# Patient Record
Sex: FEMALE | Race: WHITE | NOT HISPANIC OR LATINO | Employment: UNEMPLOYED | URBAN - METROPOLITAN AREA
[De-identification: names, ages, dates, MRNs, and addresses within clinical notes are randomized per-mention and may not be internally consistent; named-entity substitution may affect disease eponyms.]

---

## 2017-09-07 ENCOUNTER — OFFICE VISIT (OUTPATIENT)
Dept: CARDIOLOGY | Facility: MEDICAL CENTER | Age: 62
End: 2017-09-07
Payer: COMMERCIAL

## 2017-09-07 VITALS
BODY MASS INDEX: 24.11 KG/M2 | HEIGHT: 66 IN | HEART RATE: 62 BPM | OXYGEN SATURATION: 99 % | WEIGHT: 150 LBS | DIASTOLIC BLOOD PRESSURE: 70 MMHG | SYSTOLIC BLOOD PRESSURE: 130 MMHG

## 2017-09-07 DIAGNOSIS — I74.5 ILIAC ARTERY OCCLUSION, RIGHT (HCC): ICD-10-CM

## 2017-09-07 DIAGNOSIS — I15.9 SECONDARY HYPERTENSION: ICD-10-CM

## 2017-09-07 DIAGNOSIS — I70.211 ATHEROSCLEROSIS OF NATIVE ARTERY OF RIGHT LOWER EXTREMITY WITH INTERMITTENT CLAUDICATION (HCC): ICD-10-CM

## 2017-09-07 LAB — EKG IMPRESSION: NORMAL

## 2017-09-07 PROCEDURE — 93000 ELECTROCARDIOGRAM COMPLETE: CPT | Performed by: INTERNAL MEDICINE

## 2017-09-07 PROCEDURE — 99204 OFFICE O/P NEW MOD 45 MIN: CPT | Performed by: INTERNAL MEDICINE

## 2017-09-07 RX ORDER — PENTOXIFYLLINE 400 MG/1
400 TABLET, EXTENDED RELEASE ORAL
Qty: 90 TAB | Refills: 11 | Status: SHIPPED | OUTPATIENT
Start: 2017-09-07 | End: 2023-03-15

## 2017-09-07 ASSESSMENT — ENCOUNTER SYMPTOMS
COUGH: 0
SORE THROAT: 0
PND: 0
CARDIOVASCULAR NEGATIVE: 1
CHILLS: 0
LOSS OF CONSCIOUSNESS: 0
FEVER: 0
DIZZINESS: 0
STRIDOR: 0
CLAUDICATION: 0
NEUROLOGICAL NEGATIVE: 1
WEAKNESS: 0
GASTROINTESTINAL NEGATIVE: 1
RESPIRATORY NEGATIVE: 1
MUSCULOSKELETAL NEGATIVE: 1
WHEEZING: 0
SPUTUM PRODUCTION: 0
PALPITATIONS: 0
BRUISES/BLEEDS EASILY: 0
SHORTNESS OF BREATH: 0
CONSTITUTIONAL NEGATIVE: 1
ORTHOPNEA: 0
HEMOPTYSIS: 0
EYES NEGATIVE: 1

## 2017-09-07 NOTE — LETTER
[facility]   [address], [address]  [place] [address]  Phone: [phone]  Fax: [phone]              Ana Maria Sheltonstephanie Raza  1955    Encounter Date: 9/7/2017    Narciso Cespedes M.D.          PROGRESS NOTE:  No notes on file      Philip Conley M.D.  [address] [address]  VIA Facsimile: [phone]

## 2017-09-07 NOTE — PROGRESS NOTES
Subjective:   Ana Maria Raza is a 62 y.o. female who presents today as a new consultation for peripheral vascular disease. She has a long history dating history of  What sounds like iliac disease and was seen by Dr. Antwon castillo attempted a right iliac artery thrombectomy and/or  Plaque removal which apparently was unsuccessful due to the  Nature of her diffuse disease. She presents today with right lower extremity pain which has a claudication component which gets worse with exertion and is mostly relieved by rest. She does report however that she has resting leg pain. On exam she has no cyanosis or redness or decreased Refill. She does have diminished pulses in her right lower extremity probably 1+. There is mostly a absent left DP pulse. Her bilateral ABIs or abnormal greater than 1.1. She has had no further imaging of her lower extremities and has indwelling metal in her neck from effusions. She is not taking medication for cholesterol. She is on warfarin for chronic DVTs.    Past Medical History:   Diagnosis Date   • DVT (deep venous thrombosis) (CMS-HCC) 1986    right leg   • Arthritis     LEFT FOOT 2ND TOE   • Chronic neck pain    • Pain     right leg and neck, right shoulder, groin   • Unspecified hemorrhagic conditions     on coumadin   • Unspecified urinary incontinence      Past Surgical History:   Procedure Laterality Date   • HAMMERTOE CORRECTION  3/5/2014    Performed by Philip Bryant D.P.M. at SURGERY [facility] ORS   • SHOULDER ARTHROSCOPY W/ ROTATOR CUFF REPAIR  2/23/2012    Performed by DIMAS BARRETO at [facility] ORS   • SHOULDER DECOMPRESSION ARTHROSCOPIC  2/23/2012    Performed by DIMAS BARRETO at [facility] ORS   • CLAVICLE DISTAL EXCISION  2/23/2012    Performed by DIMAS BARRETO at [facility] ORS   • SHOULDER ARTHROSCOPY W/ BICIPITAL TENODESIS REPAIR  2/23/2012    Performed by DIMAS BARRETO at [facility] ORS   • TOE FUSION   8/30/2010    left foot; Performed by DINO MELO at SURGERY [facility] ORS   • LAMINOTOMY  2006    Anterior cervical fusion x 2 disc's   • GASTRIC BYPASS LAPAROSCOPIC  1995   • OTHER  1986    right leg surgery (for fistula's)   • CHOLECYSTECTOMY  1979     Family History   Problem Relation Age of Onset   • Cancer Father      brain tumor   • Heart Disease     • Hypertension     • Stroke       History   Smoking Status   • Former Smoker   • Packs/day: 1.00   • Years: 10.00   • Types: Cigarettes   • Quit date: 1/1/1990   Smokeless Tobacco   • Never Used     Allergies   Allergen Reactions   • Nkda [No Known Drug Allergy]      Outpatient Encounter Prescriptions as of 9/7/2017   Medication Sig Dispense Refill   • pentoxifylline CR (TRENTAL) 400 MG CR tablet Take 1 Tab by mouth 3 times a day, with meals. 90 Tab 11   • warfarin (COUMADIN) 1 MG TABS Take 3 Tabs by mouth every day. 100 Tab 3   • warfarin (COUMADIN) 5 MG TABS Take 1 Tab by mouth every day. 30 Tab 3   • methadone (DOLOPHINE) 10 MG TABS Take 10 mg by mouth every 3 hours.     • ciprofloxacin (CIPRO) 500 MG TABS Take 1 Tab by mouth 2 times a day. 20 Tab 0   • lidocaine (LIDODERM) 5 % PTCH Apply 1 Patch to skin as directed every 24 hours. 30 Patch 3   • ciprofloxacin (CIPRO) 500 MG TABS Take 1 Tab by mouth 2 times a day. 6 Tab 0   • Diclofenac Sodium (VOLTAREN) 1 % GEL Apply 1 g to skin as directed 2 Times a Day. 5 Tube 0   • fesoterodine fumarate (TOVIAZ) 4 MG TB24 Take 1 Tab by mouth every day. 30 Each 11   • cyclobenzaprine (FLEXERIL) 5 MG tablet Take 5-10 mg by mouth 3 times a day as needed.     • oxycodone-acetaminophen (PERCOCET) 7.5-325 MG per tablet Take 1 Tab by mouth 3 times a day.       No facility-administered encounter medications on file as of 9/7/2017.      Review of Systems   Constitutional: Negative.  Negative for chills, fever and malaise/fatigue.   HENT: Negative.  Negative for sore throat.    Eyes: Negative.    Respiratory: Negative.   "Negative for cough, hemoptysis, sputum production, shortness of breath, wheezing and stridor.    Cardiovascular: Negative.  Negative for chest pain, palpitations, orthopnea, claudication, leg swelling and PND.   Gastrointestinal: Negative.    Genitourinary: Negative.    Musculoskeletal: Negative.    Skin: Negative.    Neurological: Negative.  Negative for dizziness, loss of consciousness and weakness.   Endo/Heme/Allergies: Negative.  Does not bruise/bleed easily.   All other systems reviewed and are negative.       Objective:   /70   Pulse 62   Ht 1.676 m (5' 6\")   Wt 68 kg (150 lb)   SpO2 99%   BMI 24.21 kg/m²     Physical Exam   Constitutional: She is oriented to person, place, and time. She appears well-developed and well-nourished. No distress.   HENT:   Head: Normocephalic.   Mouth/Throat: Oropharynx is clear and moist.   Eyes: EOM are normal. Pupils are equal, round, and reactive to light. Right eye exhibits no discharge. Left eye exhibits no discharge. No scleral icterus.   Neck: Normal range of motion. Neck supple. No JVD present. No tracheal deviation present.   Cardiovascular: Normal rate, regular rhythm, S1 normal, S2 normal, normal heart sounds, intact distal pulses and normal pulses.  Exam reveals no gallop, no S3, no S4 and no friction rub.    No murmur heard.   No systolic murmur is present    No diastolic murmur is present   Pulses:       Carotid pulses are 2+ on the right side, and 2+ on the left side.       Radial pulses are 2+ on the right side, and 2+ on the left side.        Dorsalis pedis pulses are 2+ on the right side, and 2+ on the left side.        Posterior tibial pulses are 2+ on the right side, and 2+ on the left side.   Pulmonary/Chest: Effort normal and breath sounds normal. No respiratory distress. She has no wheezes. She has no rales.   Abdominal: Soft. Bowel sounds are normal. She exhibits no distension and no mass. There is no tenderness. There is no rebound and no " guarding.   Musculoskeletal: She exhibits no edema.   Neurological: She is alert and oriented to person, place, and time. No cranial nerve deficit.   Skin: Skin is warm and dry. She is not diaphoretic. No pallor.   Psychiatric: She has a normal mood and affect. Her behavior is normal. Judgment and thought content normal.   Nursing note and vitals reviewed.      Assessment:     1. Secondary hypertension  EKG    LIPID PANEL    REFERRAL TO VASCULAR SURGERY   2. Atherosclerosis of native artery of right lower extremity with intermittent claudication (CMS-HCC)  pentoxifylline CR (TRENTAL) 400 MG CR tablet    LIPID PANEL    REFERRAL TO VASCULAR SURGERY   3. Iliac artery occlusion, right (CMS-[facility])  REFERRAL TO VASCULAR SURGERY       Medical Decision Making:  Today's Assessment / Status / Plan:     62-year-old female with accommodation of chronic lower extremity DVTs and what sounds like peripheral vascular disease. Her symptoms are mostly consistent with claudication and therefore I referred her back to vascular surgery. In the meantime I would like to check her cholesterol as she would likely would have some benefit from being on a cholesterol-lowering medication. I've also started her on a low dose of Trental for her PVD. We will see her back in  3-6 months.    Thank for you allowing me to take part in your patient's care, please call should you have any questions or would like to discuss this patient.

## 2017-09-18 ENCOUNTER — TELEPHONE (OUTPATIENT)
Dept: CARDIOLOGY | Facility: MEDICAL CENTER | Age: 62
End: 2017-09-18

## 2017-09-18 NOTE — TELEPHONE ENCOUNTER
"Justin Garrison - Referral to Vascular surgery << Less Detail',event)\" href=\"javascript:;\">More Detail >>      Referral to Vascular surgery   Justin Garrison   Sent: Tue September 12, 2017 10:01 AM   To: Daly Diaz R.N.                  Message     The referral to vascular surgery will be sent to the office of [facility].   The contact number is [phone]     =================================================    Called patient, she is scheduled to see vascular surgery in November, and she requested to be put on their wait list for an earlier appointment.    BENNIE FERGUSON  "

## 2018-01-03 ENCOUNTER — HOSPITAL ENCOUNTER (OUTPATIENT)
Dept: RADIOLOGY | Facility: MEDICAL CENTER | Age: 63
End: 2018-01-03
Attending: SURGERY
Payer: COMMERCIAL

## 2018-01-03 DIAGNOSIS — Z86.718 HISTORY OF DVT (DEEP VEIN THROMBOSIS): ICD-10-CM

## 2018-01-03 DIAGNOSIS — I82.502 CHRONIC DEEP VEIN THROMBOSIS (DVT) OF LEFT LOWER EXTREMITY, UNSPECIFIED VEIN (HCC): ICD-10-CM

## 2018-01-03 PROCEDURE — 74177 CT ABD & PELVIS W/CONTRAST: CPT

## 2018-01-03 PROCEDURE — 700117 HCHG RX CONTRAST REV CODE 255: Performed by: SURGERY

## 2018-01-03 RX ADMIN — IOHEXOL 50 ML: 240 INJECTION, SOLUTION INTRATHECAL; INTRAVASCULAR; INTRAVENOUS; ORAL at 13:58

## 2018-01-03 RX ADMIN — IOHEXOL 100 ML: 350 INJECTION, SOLUTION INTRAVENOUS at 13:57

## 2018-02-21 ENCOUNTER — OFFICE VISIT (OUTPATIENT)
Dept: URGENT CARE | Facility: PHYSICIAN GROUP | Age: 63
End: 2018-02-21
Payer: COMMERCIAL

## 2018-02-21 ENCOUNTER — HOSPITAL ENCOUNTER (EMERGENCY)
Facility: MEDICAL CENTER | Age: 63
End: 2018-02-21
Attending: EMERGENCY MEDICINE
Payer: COMMERCIAL

## 2018-02-21 ENCOUNTER — APPOINTMENT (OUTPATIENT)
Dept: RADIOLOGY | Facility: MEDICAL CENTER | Age: 63
End: 2018-02-21
Attending: EMERGENCY MEDICINE
Payer: COMMERCIAL

## 2018-02-21 VITALS
HEIGHT: 66 IN | BODY MASS INDEX: 25.71 KG/M2 | HEART RATE: 63 BPM | OXYGEN SATURATION: 99 % | DIASTOLIC BLOOD PRESSURE: 72 MMHG | TEMPERATURE: 98.9 F | SYSTOLIC BLOOD PRESSURE: 158 MMHG | WEIGHT: 160 LBS

## 2018-02-21 VITALS
OXYGEN SATURATION: 96 % | HEART RATE: 78 BPM | RESPIRATION RATE: 16 BRPM | DIASTOLIC BLOOD PRESSURE: 73 MMHG | TEMPERATURE: 99 F | SYSTOLIC BLOOD PRESSURE: 175 MMHG | HEIGHT: 66 IN | WEIGHT: 158.95 LBS | BODY MASS INDEX: 25.55 KG/M2

## 2018-02-21 DIAGNOSIS — R20.0 LEFT SIDED NUMBNESS: ICD-10-CM

## 2018-02-21 DIAGNOSIS — M54.12 RADICULOPATHY OF CERVICAL SPINE: ICD-10-CM

## 2018-02-21 LAB
ALBUMIN SERPL BCP-MCNC: 4.1 G/DL (ref 3.2–4.9)
ALBUMIN/GLOB SERPL: 1.4 G/DL
ALP SERPL-CCNC: 97 U/L (ref 30–99)
ALT SERPL-CCNC: 13 U/L (ref 2–50)
ANION GAP SERPL CALC-SCNC: 11 MMOL/L (ref 0–11.9)
APTT PPP: 30.1 SEC (ref 24.7–36)
AST SERPL-CCNC: 25 U/L (ref 12–45)
BASOPHILS # BLD AUTO: 0.4 % (ref 0–1.8)
BASOPHILS # BLD: 0.02 K/UL (ref 0–0.12)
BILIRUB SERPL-MCNC: 0.8 MG/DL (ref 0.1–1.5)
BUN SERPL-MCNC: 8 MG/DL (ref 8–22)
CALCIUM SERPL-MCNC: 9.4 MG/DL (ref 8.5–10.5)
CHLORIDE SERPL-SCNC: 108 MMOL/L (ref 96–112)
CO2 SERPL-SCNC: 20 MMOL/L (ref 20–33)
CREAT SERPL-MCNC: 0.61 MG/DL (ref 0.5–1.4)
EKG IMPRESSION: NORMAL
EOSINOPHIL # BLD AUTO: 0.01 K/UL (ref 0–0.51)
EOSINOPHIL NFR BLD: 0.2 % (ref 0–6.9)
ERYTHROCYTE [DISTWIDTH] IN BLOOD BY AUTOMATED COUNT: 55.1 FL (ref 35.9–50)
GLOBULIN SER CALC-MCNC: 3 G/DL (ref 1.9–3.5)
GLUCOSE SERPL-MCNC: 88 MG/DL (ref 65–99)
HCT VFR BLD AUTO: 40.5 % (ref 37–47)
HGB BLD-MCNC: 14 G/DL (ref 12–16)
INR PPP: 2.5 (ref 0.87–1.13)
LYMPHOCYTES # BLD AUTO: 0.68 K/UL (ref 1–4.8)
LYMPHOCYTES NFR BLD: 13.4 % (ref 22–41)
MCH RBC QN AUTO: 40.5 PG (ref 27–33)
MCHC RBC AUTO-ENTMCNC: 34.6 G/DL (ref 33.6–35)
MCV RBC AUTO: 117.1 FL (ref 81.4–97.8)
MONOCYTES # BLD AUTO: 0.32 K/UL (ref 0–0.85)
MONOCYTES NFR BLD AUTO: 6.3 % (ref 0–13.4)
NEUTROPHILS # BLD AUTO: 4.06 K/UL (ref 2–7.15)
NEUTROPHILS NFR BLD: 79.7 % (ref 44–72)
PLATELET # BLD AUTO: 238 K/UL (ref 164–446)
PMV BLD AUTO: 9.6 FL (ref 9–12.9)
POTASSIUM SERPL-SCNC: 3.9 MMOL/L (ref 3.6–5.5)
PROT SERPL-MCNC: 7.1 G/DL (ref 6–8.2)
PROTHROMBIN TIME: 26.7 SEC (ref 12–14.6)
RBC # BLD AUTO: 3.46 M/UL (ref 4.2–5.4)
SODIUM SERPL-SCNC: 139 MMOL/L (ref 135–145)
TROPONIN I SERPL-MCNC: <0.01 NG/ML (ref 0–0.04)
WBC # BLD AUTO: 5.1 K/UL (ref 4.8–10.8)

## 2018-02-21 PROCEDURE — 80053 COMPREHEN METABOLIC PANEL: CPT

## 2018-02-21 PROCEDURE — 85025 COMPLETE CBC W/AUTO DIFF WBC: CPT

## 2018-02-21 PROCEDURE — 99284 EMERGENCY DEPT VISIT MOD MDM: CPT

## 2018-02-21 PROCEDURE — 99205 OFFICE O/P NEW HI 60 MIN: CPT | Performed by: PHYSICIAN ASSISTANT

## 2018-02-21 PROCEDURE — 85610 PROTHROMBIN TIME: CPT

## 2018-02-21 PROCEDURE — 85730 THROMBOPLASTIN TIME PARTIAL: CPT

## 2018-02-21 PROCEDURE — 84484 ASSAY OF TROPONIN QUANT: CPT

## 2018-02-21 PROCEDURE — 70450 CT HEAD/BRAIN W/O DYE: CPT

## 2018-02-21 PROCEDURE — 93005 ELECTROCARDIOGRAM TRACING: CPT | Performed by: EMERGENCY MEDICINE

## 2018-02-21 PROCEDURE — 71045 X-RAY EXAM CHEST 1 VIEW: CPT

## 2018-02-21 ASSESSMENT — ENCOUNTER SYMPTOMS
EXTREMITY NUMBNESS: 1
TINGLING: 1
SPUTUM PRODUCTION: 0
NAUSEA: 0
HEADACHES: 1
COUGH: 1
FATIGUE: 0
PALPITATIONS: 0
SORE THROAT: 0
NUMBNESS: 1
FOCAL WEAKNESS: 0
FEVER: 0
LOSS OF CONSCIOUSNESS: 0
WEAKNESS: 0
SENSORY CHANGE: 1
MYALGIAS: 0
VOMITING: 0
SPEECH CHANGE: 0
BLURRED VISION: 1
VISUAL CHANGE: 1
VERTIGO: 0
DIZZINESS: 0
ABDOMINAL PAIN: 0
WHEEZING: 0
SHORTNESS OF BREATH: 0
CHILLS: 0

## 2018-02-21 ASSESSMENT — PAIN SCALES - GENERAL: PAINLEVEL_OUTOF10: 4

## 2018-02-21 NOTE — ED TRIAGE NOTES
"Chief Complaint   Patient presents with   • Numbness     left hand leg     Pt report onset of left leg and arm numbness and tingling started last night. Pt reports blurred vision with HA.  Pt AAO x4. No facial droop. Pt denies any falls or trauma. Blood pressure (!) 175/73, pulse 91, temperature 37.2 °C (99 °F), temperature source Temporal, resp. rate 16, height 1.676 m (5' 6\"), weight 72.1 kg (158 lb 15.2 oz), SpO2 99 %.      "
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
## 2018-02-21 NOTE — PROGRESS NOTES
Subjective:      Ana Maria Raza is a 62 y.o. female who presents with Tingling (tingling sensation in left arm and leg onset last night)            Numbness   This is a new problem. The current episode started yesterday (yesterday at 11 pm- left arm and left leg tingling and numbness). The problem occurs constantly. The problem has been unchanged. Associated symptoms include coughing, headaches, numbness and a visual change (blurred vision- bilateral last night- resolved). Pertinent negatives include no abdominal pain, chest pain, chills, congestion, fatigue, fever, myalgias, nausea, sore throat, vertigo, vomiting or weakness. Nothing aggravates the symptoms. Treatments tried: patient has cold symptoms. Patient took OTC cold medication.       Past Medical History:   Diagnosis Date   • Arthritis     LEFT FOOT 2ND TOE   • Chronic neck pain    • DVT (deep venous thrombosis) (CMS-[facility]) 1986    right leg   • Pain     right leg and neck, right shoulder, groin   • Unspecified hemorrhagic conditions     on coumadin   • Unspecified urinary incontinence        Past Surgical History:   Procedure Laterality Date   • HAMMERTOE CORRECTION  3/5/2014    Performed by Philip Bryant D.P.M. at [facility] ORS   • SHOULDER ARTHROSCOPY W/ ROTATOR CUFF REPAIR  2/23/2012    Performed by DIMAS BARRETO at [facility]   • SHOULDER DECOMPRESSION ARTHROSCOPIC  2/23/2012    Performed by DIMAS BARRETO at [facility]   • CLAVICLE DISTAL EXCISION  2/23/2012    Performed by DIMAS BARRETO at [facility] ORS   • SHOULDER ARTHROSCOPY W/ BICIPITAL TENODESIS REPAIR  2/23/2012    Performed by DIMAS BARRETO at [facility]   • TOE FUSION  8/30/2010    left foot; Performed by DINO MELO at [facility]   • LAMINOTOMY  2006    Anterior cervical fusion x 2 disc's   • GASTRIC BYPASS LAPAROSCOPIC  1995   • OTHER  1986    right leg surgery (for fistula's)   •  "CHOLECYSTECTOMY  1979       Family History   Problem Relation Age of Onset   • Cancer Father      brain tumor   • Heart Disease     • Hypertension     • Stroke         Allergies   Allergen Reactions   • Nkda [No Known Drug Allergy]        Medications, Allergies, and current problem list reviewed today in Epic    Review of Systems   Constitutional: Negative for chills, fatigue, fever and malaise/fatigue.   HENT: Negative for congestion, ear discharge, ear pain and sore throat.    Eyes: Positive for blurred vision.   Respiratory: Positive for cough. Negative for sputum production, shortness of breath and wheezing.    Cardiovascular: Negative for chest pain, palpitations and leg swelling.   Gastrointestinal: Negative for abdominal pain, nausea and vomiting.   Musculoskeletal: Negative for myalgias.   Neurological: Positive for tingling, sensory change, numbness and headaches. Negative for dizziness, vertigo, speech change, focal weakness, loss of consciousness and weakness.     All other systems reviewed and are negative.        Objective:     /72   Pulse 63   Temp 37.2 °C (98.9 °F)   Ht 1.676 m (5' 6\")   Wt 72.6 kg (160 lb)   SpO2 99%   BMI 25.82 kg/m²      Physical Exam   Constitutional: She is oriented to person, place, and time. She appears well-developed and well-nourished. No distress.   HENT:   Head: Normocephalic and atraumatic.   Mouth/Throat: Uvula is midline, oropharynx is clear and moist and mucous membranes are normal.   Eyes: Conjunctivae and EOM are normal. Pupils are equal, round, and reactive to light.   Cardiovascular: Normal rate, regular rhythm and normal heart sounds.  Exam reveals no gallop and no friction rub.    No murmur heard.  Pulmonary/Chest: Effort normal. No respiratory distress. She has no wheezes. She has no rales.   Neurological: She is alert and oriented to person, place, and time. She has normal strength. A sensory deficit (decreased sensation to touch on left hand- more " so on ulnar distribution and left lateral leg and foot) is present. No cranial nerve deficit.    strength normal bilaterally. No pronator drift.   Skin: Skin is warm and dry. No rash noted.   Psychiatric: She has a normal mood and affect. Her behavior is normal. Judgment and thought content normal.               Assessment/Plan:     1. Left sided numbness    At this time, I feel the patient requires a higher level of care including closer monitoring, stat lab work and/or imaging for further evaluation for complaints of left sided numbness.This has been discussed with the patient and they state agreement and understanding.  I offered the patient an ambulance ride and  the patient is declining at this time. Patient signed AMA- refusing [facility] transport. The patient is in no acute distress upon clinic departure and will go directly to ED without delay. Her  will drive her via POV.     - Tried calling [facility]. Was placed on hold multiple times- On hold with transfer center > 5 minutes.    -  AMA/Refusal of Treatment      Nichole Sky P.A.-C.

## 2018-02-21 NOTE — DISCHARGE INSTRUCTIONS
Cervical Radiculopathy  Cervical radiculopathy happens when a nerve in the neck is pinched or bruised by a slipped (herniated) disk or by arthritic changes in the bones of the cervical spine. This can occur due to an injury or as part of the normal aging process. Pressure on the cervical nerves can cause pain or numbness that runs from your neck all the way down into your arm and fingers.  CAUSES   There are many possible causes, including:  · Injury.  · Muscle tightness in the neck from overuse.  · Swollen, painful joints (arthritis).  · Breakdown or degeneration in the bones and joints of the spine (spondylosis) due to aging.  · Bone spurs that may develop near the cervical nerves.  SYMPTOMS   Symptoms include pain, weakness, or numbness in the affected arm and hand. Pain can be severe or irritating. Symptoms may be worse when extending or turning the neck.  DIAGNOSIS   Your caregiver will ask about your symptoms and do a physical exam. He or she may test your strength and reflexes. X-rays, CT scans, and MRI scans may be needed in cases of injury or if the symptoms do not go away after a period of time. Electromyography (EMG) or nerve conduction testing may be done to study how your nerves and muscles are working.  TREATMENT   Your caregiver may recommend certain exercises to help relieve your symptoms. Cervical radiculopathy can, and often does, get better with time and treatment. If your problems continue, treatment options may include:  · Wearing a soft collar for short periods of time.  · Physical therapy to strengthen the neck muscles.  · Medicines, such as nonsteroidal anti-inflammatory drugs (NSAIDs), oral corticosteroids, or spinal injections.  · Surgery. Different types of surgery may be done depending on the cause of your problems.  HOME CARE INSTRUCTIONS   · Put ice on the affected area.  ¨ Put ice in a plastic bag.  ¨ Place a towel between your skin and the bag.  ¨ Leave the ice on for 15-20 minutes,  03-04 times a day or as directed by your caregiver.  · If ice does not help, you can try using heat. Take a warm shower or bath, or use a hot water bottle as directed by your caregiver.  · You may try a gentle neck and shoulder massage.  · Use a flat pillow when you sleep.  · Only take over-the-counter or prescription medicines for pain, discomfort, or fever as directed by your caregiver.  · If physical therapy was prescribed, follow your caregiver's directions.  · If a soft collar was prescribed, use it as directed.  SEEK IMMEDIATE MEDICAL CARE IF:   · Your pain gets much worse and cannot be controlled with medicines.  · You have weakness or numbness in your hand, arm, face, or leg.  · You have a high fever or a stiff, rigid neck.  · You lose bowel or bladder control (incontinence).  · You have trouble with walking, balance, or speaking.  MAKE SURE YOU:   · Understand these instructions.  · Will watch your condition.  · Will get help right away if you are not doing well or get worse.     This information is not intended to replace advice given to you by your health care provider. Make sure you discuss any questions you have with your health care provider.     Document Released: 09/12/2002 Document Revised: 03/11/2013 Document Reviewed: 02/11/2016  [email or web address] Interactive Patient Education ©2016 [email or web address]

## 2018-08-31 ENCOUNTER — OFFICE VISIT (OUTPATIENT)
Dept: PHYSICAL MEDICINE AND REHAB | Facility: MEDICAL CENTER | Age: 63
End: 2018-08-31
Payer: COMMERCIAL

## 2018-08-31 VITALS
SYSTOLIC BLOOD PRESSURE: 126 MMHG | DIASTOLIC BLOOD PRESSURE: 62 MMHG | OXYGEN SATURATION: 95 % | WEIGHT: 164.9 LBS | TEMPERATURE: 97.7 F | BODY MASS INDEX: 26.5 KG/M2 | HEIGHT: 66 IN | HEART RATE: 79 BPM

## 2018-08-31 DIAGNOSIS — G89.29 OTHER CHRONIC PAIN: ICD-10-CM

## 2018-08-31 DIAGNOSIS — M79.604 RIGHT LEG PAIN: ICD-10-CM

## 2018-08-31 DIAGNOSIS — E55.9 VITAMIN D DEFICIENCY: ICD-10-CM

## 2018-08-31 PROCEDURE — 99205 OFFICE O/P NEW HI 60 MIN: CPT | Performed by: PHYSICAL MEDICINE & REHABILITATION

## 2018-08-31 RX ORDER — DULOXETIN HYDROCHLORIDE 30 MG/1
30 CAPSULE, DELAYED RELEASE ORAL DAILY
Qty: 7 CAP | Refills: 0 | Status: SHIPPED | OUTPATIENT
Start: 2018-08-31 | End: 2018-09-07

## 2018-08-31 RX ORDER — WARFARIN SODIUM 3 MG/1
3-6 TABLET ORAL EVERY MORNING
COMMUNITY

## 2018-08-31 RX ORDER — DULOXETIN HYDROCHLORIDE 60 MG/1
60 CAPSULE, DELAYED RELEASE ORAL DAILY
Qty: 30 CAP | Refills: 1 | Status: SHIPPED | OUTPATIENT
Start: 2018-08-31 | End: 2018-09-30

## 2018-08-31 ASSESSMENT — PAIN SCALES - GENERAL: PAINLEVEL: 8=MODERATE-SEVERE PAIN

## 2018-08-31 NOTE — PROGRESS NOTES
New patient note    Physiatry (physical medicine and  Rehabilitation), interventional spine and sports medicine    Date of Service: 8/31/2018    Chief complaint: Pain in the right leg    HISTORY    HPI: Ana Maria Raza 63 y.o. female who presents today for evaluation of right groin pain and right leg pain.  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   Lately, she reports that she has had more pain and swelling in the right leg.   This has been gradually worsening and she does not feel like the stockings are as helpful.    She does not particularly note any back pain.  There is sharp pain in the groin. She does not report significant knee pain.  The aching in her calf seems to be more anterior than posterior and she reports that the aching pain is constant.  It is made worse with walking or standing and going up and down stairs.         Medical records review:  Previous treatments:    Physical Therapy: No     Medications the patient is tried: NSAIDs, Narcotics and tylenol, she reports that she has also tried CBD oil without help.    ORT:0   PHQ-9:2    ROS:   ENT: uses hearing aids  Eye: wears glasses    Red Flags ROS:   Fever, Chills, Sweats: Denies  Involuntary Weight Loss: Denies  Bladder Incontinence: Denies  Bowel Incontinence: denies  Saddle Anesthesia: Denies    All other systems reviewed and negative.       PMHx:   Past Medical History:   Diagnosis Date   • Arthritis     LEFT FOOT 2ND TOE   • Chronic neck pain    • DVT (deep venous thrombosis) ([facility]) 1986    right leg   • Pain     right leg and neck, right shoulder, groin   • Unspecified hemorrhagic conditions     on coumadin   • Unspecified urinary incontinence        PSHx:   Past Surgical History:   Procedure Laterality Date   • HAMMERTOE CORRECTION   3/5/2014    Performed by Philip Bryant D.P.M. at [facility]S   • SHOULDER ARTHROSCOPY W/ ROTATOR CUFF REPAIR  2/23/2012    Performed by DIMAS BARRETO at [facility] ORS   • SHOULDER DECOMPRESSION ARTHROSCOPIC  2/23/2012    Performed by DIMAS BARRETO at [facility] ORS   • CLAVICLE DISTAL EXCISION  2/23/2012    Performed by DIMAS BARRETO at [facility] ORS   • SHOULDER ARTHROSCOPY W/ BICIPITAL TENODESIS REPAIR  2/23/2012    Performed by DIMAS BARRETO at [facility] ORS   • TOE FUSION  8/30/2010    left foot; Performed by DINO MELO at [facility] ORS   • LAMINOTOMY  2006    Anterior cervical fusion x 2 disc's   • GASTRIC BYPASS LAPAROSCOPIC  1995   • OTHER  1986    right leg surgery (for fistula's)   • CHOLECYSTECTOMY  1979       Family history   Family History   Problem Relation Age of Onset   • Cancer Father         brain tumor   • Heart Disease Unknown    • Hypertension Unknown    • Stroke Unknown          Medications:   Current Outpatient Prescriptions   Medication   • warfarin (COUMADIN) 3 MG Tab   • DULoxetine (CYMBALTA) 30 MG Cap DR Particles   • DULoxetine (CYMBALTA) 60 MG Cap DR Particles delayed-release capsule   • warfarin (COUMADIN) 5 MG TABS   • pentoxifylline CR (TRENTAL) 400 MG CR tablet   • ciprofloxacin (CIPRO) 500 MG TABS   • warfarin (COUMADIN) 1 MG TABS   • lidocaine (LIDODERM) 5 % PTCH   • ciprofloxacin (CIPRO) 500 MG TABS   • Diclofenac Sodium (VOLTAREN) 1 % GEL   • fesoterodine fumarate (TOVIAZ) 4 MG TB24   • methadone (DOLOPHINE) 10 MG TABS   • cyclobenzaprine (FLEXERIL) 5 MG tablet   • oxycodone-acetaminophen (PERCOCET) 7.5-325 MG per tablet     No current facility-administered medications for this visit.        Allergies:   Allergies   Allergen Reactions   • Nkda [No Known Drug Allergy]        Social Hx:   Social History     Social History   • Marital status:      Spouse name: N/A   • Number of children:  "N/A   • Years of education: N/A     Occupational History   • Not on file.     Social History Main Topics   • Smoking status: Former Smoker     Packs/day: 1.00     Years: 10.00     Types: Cigarettes     Quit date: 1/1/1990   • Smokeless tobacco: Never Used   • Alcohol use 10.5 oz/week     21 Standard drinks or equivalent per week      Comment: 3 per day   • Drug use: No   • Sexual activity: Not on file     Other Topics Concern   •  Service No   • Blood Transfusions Yes   • Caffeine Concern No   • Occupational Exposure No   • Hobby Hazards No   • Sleep Concern No   • Stress Concern No   • Weight Concern No   • Special Diet No   • Back Care No   • Exercise No   • Bike Helmet No   • Seat Belt Yes   • Self-Exams Yes     Social History Narrative   • No narrative on file         EXAMINATION     Physical Exam:   Vitals: Blood pressure 126/62, pulse 79, temperature 36.5 °C (97.7 °F), height 1.676 m (5' 6\"), weight 74.8 kg (164 lb 14.5 oz), SpO2 95 %.    Constitutional:   Body Habitus: Body mass index is 26.62 kg/m².  Cooperation: Fully cooperates with exam  Appearance: Well-groomed, well-nourished, not disheveled, in no acute distress    Eyes: No scleral icterus, no proptosis     ENT -no obvious auditory deficits, no facial droop     Skin -no rashes or lesions noted     Respiratory-  breathing comfortable on room air, no audible wheezing    Cardiovascular- No lower extremity edema is noted.  Right lower extremity is minimally larger than the left    Psychiatric- alert and oriented ×3. Normal affect.     Gait - normal gait, no use of ambulatory device, nonantalgic. The patient can toe walk with ease. The patient can heel walk with ease.    Musculoskeletal -   Cervical spine   Inspection: No deformities of the skin over the cervical spine. No rashes or lesions.    full  A/P ROM in all directions, without  pain      Spurling’s sign: negative bilaterally    No signs of muscular atrophy in bilateral upper extremities "     Thoracic/Lumbar Spine/Sacral Spine/Hips   Inspection: No evidence of atrophy in bilateral lower extremities throughout     ROM: full  AROM with flexion, extension, lateral flexion, and rotation bilaterally, without pain     Palpation:   No tenderness to palpation in midline at T1-T12 levels. No tenderness to palpation in the left and right of the midline T1-L5  palpation over SI joint: negative bilaterally    palpation over buttock: negative bilaterally    palpation in hip or over the greater trochanters: negative bilaterally      Lumbar spine Special tests  Neuro tension  Straight leg test negative bilaterally       HIP  FAIR test negative bilaterally    Range of motion in the hips is within normal limits in flexion, extension, abduction, internal rotation, external rotation with minimally decreased IR and ER on the right compared with the left    SI joint tests  Observation patient sits on one buttocks: Negative  ANICETO test negative bilaterally       Neuro       Key points for the international standards for neurological classification of spinal cord injury (ISNCSCI) to light touch.     Dermatome R L   C4 2  2   C5 2 2   C6 2 2   C7 2 2   C8 2 2   T1 2 2   T2 2 2   L2 2 2   L3 2 2   L4 2 2   L5 2 2   S1 2 2   S2 2 2           Motor Exam Upper Extremities   ? Myotome R L   Shoulder flexion C5 5 5   Elbow flexion C5 5 5   Wrist extension C6 5 5   Elbow extension C7 5 5   Finger flexion C8 5 5   Finger abduction T1 5 5         Motor Exam Lower Extremities    ? Myotome R L   Hip flexion L2 5 5   Knee extension L3 5 5   Ankle dorsiflexion L4 5 5   Toe extension L5 5 5   Ankle plantarflexion S1 5 5         Guerra’s sign negative bilaterally   Babinski sign negative bilaterally   Clonus of the ankle negative bilaterally     Reflexes  ?  R L   Biceps  2+  2+   Brachioradialis  2+ 2+   Patella  2+ 2+   Achilles   2+ 2+       MEDICAL DECISION MAKING    Medical records review: see under HPI section.     DATA    Labs:    Lab Results   Component Value Date/Time    SODIUM 139 02/21/2018 10:52 AM    POTASSIUM 3.9 02/21/2018 10:52 AM    CHLORIDE 108 02/21/2018 10:52 AM    CO2 20 02/21/2018 10:52 AM    ANION 11.0 02/21/2018 10:52 AM    GLUCOSE 88 02/21/2018 10:52 AM    BUN 8 02/21/2018 10:52 AM    CREATININE 0.61 02/21/2018 10:52 AM    CALCIUM 9.4 02/21/2018 10:52 AM    ASTSGOT 25 02/21/2018 10:52 AM    ALTSGPT 13 02/21/2018 10:52 AM    TBILIRUBIN 0.8 02/21/2018 10:52 AM    ALBUMIN 4.1 02/21/2018 10:52 AM    TOTPROTEIN 7.1 02/21/2018 10:52 AM    GLOBULIN 3.0 02/21/2018 10:52 AM    AGRATIO 1.4 02/21/2018 10:52 AM       Lab Results   Component Value Date/Time    PROTHROMBTM 26.7 (H) 02/21/2018 10:52 AM    INR 2.50 (H) 02/21/2018 10:52 AM        Lab Results   Component Value Date/Time    WBC 5.1 02/21/2018 10:52 AM    RBC 3.46 (L) 02/21/2018 10:52 AM    HEMOGLOBIN 14.0 02/21/2018 10:52 AM    HEMATOCRIT 40.5 02/21/2018 10:52 AM    .1 (H) 02/21/2018 10:52 AM    MCH 40.5 (H) 02/21/2018 10:52 AM    MCHC 34.6 02/21/2018 10:52 AM    MPV 9.6 02/21/2018 10:52 AM    NEUTSPOLYS 79.70 (H) 02/21/2018 10:52 AM    LYMPHOCYTES 13.40 (L) 02/21/2018 10:52 AM    MONOCYTES 6.30 02/21/2018 10:52 AM    EOSINOPHILS 0.20 02/21/2018 10:52 AM    BASOPHILS 0.40 02/21/2018 10:52 AM        No results found for: HBA1C     Imaging:   Imaging reviewed, these are my reads:  MRI cervical spine  05/10/2011  Anterior plate and screw fixation C5-T1. There is note of metallic artifact, but there does appear to be signal change in the cervical spine.  There is no significant cervical spinal stenosis or disc herniations     IMAGING radiology reads. I reviewed the following radiology reads   CT abdomen and pelvis 01/03/2018  1.  Negative for pelvic deep venous thrombus    2.  Dilation the right atrium and inferior vena cava suggesting elevated right heart pressures    3.  Prior cholecystectomy. Mild basilar dilation with common bile duct measuring 11 mm. Significance of  this finding depends primarily on whether there is abnormality of liver function test    4.  Bilateral renal cyst    5.  Gastric and right inguinal postoperative changes    6.  Aortic atherosclerotic plaque                                    Results for orders placed during the hospital encounter of 05/10/11   MR-CERVICAL SPINE-W/O    Impression Extensive prior surgery and multilevel moderate spondylosis without cord compression or root compression.  No change.                                                               Results for orders placed during the hospital encounter of 02/09/07   DX-CERVICAL SPINE-4+ VIEWS    Impression IMPRESSION:    1. C5-T1 INTERBODY FUSION WITH BONY INGROWTH THROUGH A METALLIC CAGE, AND   WITH NO RADIOGRAPHIC EVIDENCE OF COMPLICATION.     2. NO SEGMENTAL INSTABILITY BETWEEN THE FLEXION AND EXTENSION IMAGES.     GEK/bela            Read By HUGH JOSEPH MD on Feb 9 2007 10:38AM  : ZANNE Transcription Date: Feb 9 2007  2:27PM  THIS DOCUMENT HAS BEEN ELECTRONICALLY SIGNED BY: HUGH JOSEPH MD on   Feb 9 2007  4:15PM                                                                                Diagnosis   Visit Diagnoses     ICD-10-CM   1. Right leg pain M79.604   2. Vitamin D deficiency E55.9   3. Other chronic pain G89.29           ASSESSMENT:  Anson Natasha Raza 63 y.o. female with report of right groin and right leg pain     An aMaria was seen today for new patient.    Diagnoses and all orders for this visit:    Right leg pain  -     DX-LUMBAR SPINE-2 OR 3 VIEWS  -     DX-HIP-COMPLETE - UNILATERAL 2+ RIGHT  -     DX-KNEE 3 VIEWS RIGHT  -     DULoxetine (CYMBALTA) 30 MG Cap DR Particles; Take 1 Cap by mouth every day for 7 days.  -     DULoxetine (CYMBALTA) 60 MG Cap DR Particles delayed-release capsule; Take 1 Cap by mouth every day for 30 days.    Vitamin D deficiency  -     VITAMIN D,25 HYDROXY; Future    Other chronic pain  -     DULoxetine (CYMBALTA)  30 MG Cap DR Particles; Take 1 Cap by mouth every day for 7 days.  -     DULoxetine (CYMBALTA) 60 MG Cap DR Particles delayed-release capsule; Take 1 Cap by mouth every day for 30 days.    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 Plan to evaluate right hip and knee as well as the lumbar spine, as it is possible that symptoms have been attributed to the AV fistula and recurrent DVTs.      Discussed rechecking her vitamin D status as this has been low in the past.    Discussed trial of cymbalta for chronic leg pain.  She is agreeable to a trial.     Follow-up:4 weeks or after imaging    Please note that this dictation was created using voice recognition software. I have made every reasonable attempt to correct obvious errors but there may be errors of grammar and content that I may have overlooked prior to finalization of this note.      Joe Vigil MD  Physical Medicine and Rehabilitation  Interventional Spine and Sports Physiatry  [facility]

## 2018-09-01 ASSESSMENT — PATIENT HEALTH QUESTIONNAIRE - PHQ9
5. POOR APPETITE OR OVEREATING: 0 - NOT AT ALL
CLINICAL INTERPRETATION OF PHQ2 SCORE: 1
SUM OF ALL RESPONSES TO PHQ QUESTIONS 1-9: 2

## 2018-10-04 ENCOUNTER — HOSPITAL ENCOUNTER (OUTPATIENT)
Dept: RADIOLOGY | Facility: MEDICAL CENTER | Age: 63
End: 2018-10-04
Attending: PHYSICAL MEDICINE & REHABILITATION
Payer: COMMERCIAL

## 2018-10-04 PROCEDURE — 72100 X-RAY EXAM L-S SPINE 2/3 VWS: CPT

## 2018-10-04 PROCEDURE — 73562 X-RAY EXAM OF KNEE 3: CPT | Mod: RT

## 2018-10-04 PROCEDURE — 73502 X-RAY EXAM HIP UNI 2-3 VIEWS: CPT | Mod: RT

## 2018-10-05 ENCOUNTER — OFFICE VISIT (OUTPATIENT)
Dept: PHYSICAL MEDICINE AND REHAB | Facility: MEDICAL CENTER | Age: 63
End: 2018-10-05
Payer: COMMERCIAL

## 2018-10-05 VITALS
DIASTOLIC BLOOD PRESSURE: 82 MMHG | BODY MASS INDEX: 26.82 KG/M2 | HEIGHT: 66 IN | HEART RATE: 90 BPM | TEMPERATURE: 98.4 F | WEIGHT: 166.89 LBS | SYSTOLIC BLOOD PRESSURE: 118 MMHG | OXYGEN SATURATION: 99 %

## 2018-10-05 DIAGNOSIS — G89.29 OTHER CHRONIC PAIN: ICD-10-CM

## 2018-10-05 DIAGNOSIS — M79.604 RIGHT LEG PAIN: ICD-10-CM

## 2018-10-05 PROCEDURE — 99213 OFFICE O/P EST LOW 20 MIN: CPT | Performed by: PHYSICAL MEDICINE & REHABILITATION

## 2018-10-05 RX ORDER — DULOXETIN HYDROCHLORIDE 60 MG/1
60 CAPSULE, DELAYED RELEASE ORAL DAILY
Qty: 30 CAP | Refills: 2 | Status: SHIPPED | OUTPATIENT
Start: 2018-10-05 | End: 2018-11-04

## 2018-10-05 RX ORDER — GABAPENTIN 100 MG/1
100 CAPSULE ORAL 3 TIMES DAILY
Qty: 90 CAP | Refills: 1 | Status: SHIPPED | OUTPATIENT
Start: 2018-10-05 | End: 2018-11-04

## 2018-10-05 ASSESSMENT — PAIN SCALES - GENERAL: PAINLEVEL: 6=MODERATE PAIN

## 2018-10-05 NOTE — PATIENT INSTRUCTIONS
Start 100mg at bedtime for 3-5 days, then 200mg (2 pills) at bedtime for 3-5 days, then take 300mg (3pills) at bedtime.

## 2018-10-05 NOTE — PROGRESS NOTES
Follow-up patient note    Physiatry (physical medicine and  Rehabilitation), interventional spine and sports medicine    Date of Service: 10/05/2018    Chief complaint: Pain in the right leg    HISTORY  Ana Maria Raza 63 y.o. female who presents today for evaluation of right groin pain and right leg pain.  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   Lately, she reports that she has had more pain and swelling in the right leg.   This has been gradually worsening and she does not feel like the stockings are as helpful.    Interval history:    Since the last visit, she was started on duloxetine.  She does not have any side effects, but does not feel like this has helped with her pain at all.    Pain continues to be sharp in the groin and she has aching pain that is constant in the calf.  This is of the same quality that it was when she was here last.  It is made worse with walking and standing, going up and down stairs.  She continues to denies low back pain.    Medical records review:  ORT:0   PHQ-9:2    ROS: Unchanged from 08/31/2018  ENT: uses hearing aids  Eye: wears glasses    Red Flags ROS:   Fever, Chills, Sweats: Denies  Involuntary Weight Loss: Denies  Bladder Incontinence: Denies  Bowel Incontinence: denies  Saddle Anesthesia: Denies    All other systems reviewed and negative.       PMHx:   Past Medical History:   Diagnosis Date   • Arthritis     LEFT FOOT 2ND TOE   • Chronic neck pain    • DVT (deep venous thrombosis) ([facility]) 1986    right leg   • Pain     right leg and neck, right shoulder, groin   • Unspecified hemorrhagic conditions     on coumadin   • Unspecified urinary incontinence        PSHx:   Past Surgical History:   Procedure Laterality Date   • HAMMERTOE CORRECTION  3/5/2014    Performed by  Philip Bryant D.P.M. at SURGERY [facility] ORS   • SHOULDER ARTHROSCOPY W/ ROTATOR CUFF REPAIR  2/23/2012    Performed by DIMAS BARRETO at [facility] ORS   • SHOULDER DECOMPRESSION ARTHROSCOPIC  2/23/2012    Performed by DIMAS BARRETO at [facility] ORS   • CLAVICLE DISTAL EXCISION  2/23/2012    Performed by DIMAS BARRETO at [facility] ORS   • SHOULDER ARTHROSCOPY W/ BICIPITAL TENODESIS REPAIR  2/23/2012    Performed by DIMAS BARRETO at [facility] ORS   • TOE FUSION  8/30/2010    left foot; Performed by DINO MELO at [facility]   • LAMINOTOMY  2006    Anterior cervical fusion x 2 disc's   • GASTRIC BYPASS LAPAROSCOPIC  1995   • OTHER  1986    right leg surgery (for fistula's)   • CHOLECYSTECTOMY  1979       Family history   Family History   Problem Relation Age of Onset   • Cancer Father         brain tumor   • Heart Disease Unknown    • Hypertension Unknown    • Stroke Unknown          Medications:   Current Outpatient Prescriptions   Medication   • DULoxetine (CYMBALTA) 60 MG Cap DR Particles delayed-release capsule   • gabapentin (NEURONTIN) 100 MG Cap   • warfarin (COUMADIN) 3 MG Tab   • warfarin (COUMADIN) 1 MG TABS   • ciprofloxacin (CIPRO) 500 MG TABS   • pentoxifylline CR (TRENTAL) 400 MG CR tablet   • ciprofloxacin (CIPRO) 500 MG TABS   • lidocaine (LIDODERM) 5 % PTCH   • warfarin (COUMADIN) 5 MG TABS   • Diclofenac Sodium (VOLTAREN) 1 % GEL   • fesoterodine fumarate (TOVIAZ) 4 MG TB24   • methadone (DOLOPHINE) 10 MG TABS   • cyclobenzaprine (FLEXERIL) 5 MG tablet   • oxycodone-acetaminophen (PERCOCET) 7.5-325 MG per tablet     No current facility-administered medications for this visit.        Allergies:   Allergies   Allergen Reactions   • Nkda [No Known Drug Allergy]        Social Hx:   Social History     Social History   • Marital status:      Spouse name: N/A   • Number of children: N/A   • Years of education: N/A  "    Occupational History   • Not on file.     Social History Main Topics   • Smoking status: Former Smoker     Packs/day: 1.00     Years: 10.00     Types: Cigarettes     Quit date: 1/1/1990   • Smokeless tobacco: Never Used   • Alcohol use 10.5 oz/week     21 Standard drinks or equivalent per week      Comment: 3 per day   • Drug use: No   • Sexual activity: Not on file     Other Topics Concern   •  Service No   • Blood Transfusions Yes   • Caffeine Concern No   • Occupational Exposure No   • Hobby Hazards No   • Sleep Concern No   • Stress Concern No   • Weight Concern No   • Special Diet No   • Back Care No   • Exercise No   • Bike Helmet No   • Seat Belt Yes   • Self-Exams Yes     Social History Narrative   • No narrative on file         EXAMINATION     Physical Exam:   Vitals: Blood pressure 118/82, pulse 90, temperature 36.9 °C (98.4 °F), temperature source Temporal, height 1.676 m (5' 6\"), weight 75.7 kg (166 lb 14.2 oz), SpO2 99 %.    Constitutional:   Body Habitus: Body mass index is 26.94 kg/m².  Cooperation: Fully cooperates with exam  Appearance: Well-groomed, well-nourished, not disheveled, in no acute distress    Eyes: No scleral icterus, no proptosis   ENT -no obvious auditory deficits, no facial droop   Skin -no rashes or lesions noted   Respiratory-  breathing comfortable on room air, no audible wheezing  Cardiovascular- No lower extremity edema  Psychiatric- alert and oriented ×3. Normal affect.   Gait - normal gait, no use of ambulatory device, nonantalgic    Musculoskeletal -    HIP  Range of motion in the hips is within normal limits in flexion, extension, abduction, internal rotation, external rotation with minimally decreased IR and ER on the right compared with the left    Neuro       Key points for the international standards for neurological classification of spinal cord injury (ISNCSCI) to light touch.     Dermatome R L   L2 2 2   L3 2 2   L4 2 2   L5 2 2   S1 2 2   S2 2 2 "       Motor Exam Lower Extremities    ? Myotome R L   Hip flexion L2 5 5   Knee extension L3 5 5   Ankle dorsiflexion L4 5 5   Toe extension L5 5 5   Ankle plantarflexion S1 5 5         Reflexes  ?  R L   Patella  2+ 2+   Achilles   2+ 2+       MEDICAL DECISION MAKING    Medical records review: see under HPI section.     DATA    Labs: No new labs to review  Lab Results   Component Value Date/Time    SODIUM 139 02/21/2018 10:52 AM    POTASSIUM 3.9 02/21/2018 10:52 AM    CHLORIDE 108 02/21/2018 10:52 AM    CO2 20 02/21/2018 10:52 AM    ANION 11.0 02/21/2018 10:52 AM    GLUCOSE 88 02/21/2018 10:52 AM    BUN 8 02/21/2018 10:52 AM    CREATININE 0.61 02/21/2018 10:52 AM    CALCIUM 9.4 02/21/2018 10:52 AM    ASTSGOT 25 02/21/2018 10:52 AM    ALTSGPT 13 02/21/2018 10:52 AM    TBILIRUBIN 0.8 02/21/2018 10:52 AM    ALBUMIN 4.1 02/21/2018 10:52 AM    TOTPROTEIN 7.1 02/21/2018 10:52 AM    GLOBULIN 3.0 02/21/2018 10:52 AM    AGRATIO 1.4 02/21/2018 10:52 AM       Lab Results   Component Value Date/Time    PROTHROMBTM 26.7 (H) 02/21/2018 10:52 AM    INR 2.50 (H) 02/21/2018 10:52 AM        Lab Results   Component Value Date/Time    WBC 5.1 02/21/2018 10:52 AM    RBC 3.46 (L) 02/21/2018 10:52 AM    HEMOGLOBIN 14.0 02/21/2018 10:52 AM    HEMATOCRIT 40.5 02/21/2018 10:52 AM    .1 (H) 02/21/2018 10:52 AM    MCH 40.5 (H) 02/21/2018 10:52 AM    MCHC 34.6 02/21/2018 10:52 AM    MPV 9.6 02/21/2018 10:52 AM    NEUTSPOLYS 79.70 (H) 02/21/2018 10:52 AM    LYMPHOCYTES 13.40 (L) 02/21/2018 10:52 AM    MONOCYTES 6.30 02/21/2018 10:52 AM    EOSINOPHILS 0.20 02/21/2018 10:52 AM    BASOPHILS 0.40 02/21/2018 10:52 AM         Imaging:  Imaging reviewed, these are my reads:    Xray right knee 10/04/2018: There is note of osteopenia and mildly decreased medial joint space.  No acute fracture    Xray right hip:10/04/2018: Sclerosis and mild joint space narrowing.  No evidence of fracture.    Reviewed previously:  MRI cervical spine   05/10/2011  Anterior plate and screw fixation C5-T1. There is note of metallic artifact, but there does appear to be signal change in the cervical spine.  There is no significant cervical spinal stenosis or disc herniations     IMAGING radiology reads. I reviewed the following radiology reads   CT abdomen and pelvis 01/03/2018  1.  Negative for pelvic deep venous thrombus    2.  Dilation the right atrium and inferior vena cava suggesting elevated right heart pressures    3.  Prior cholecystectomy. Mild basilar dilation with common bile duct measuring 11 mm. Significance of this finding depends primarily on whether there is abnormality of liver function test    4.  Bilateral renal cyst    5.  Gastric and right inguinal postoperative changes    6.  Aortic atherosclerotic plaque                                                                                        Results for orders placed during the hospital encounter of 02/09/07   DX-CERVICAL SPINE-4+ VIEWS    Impression IMPRESSION:    1. C5-T1 INTERBODY FUSION WITH BONY INGROWTH THROUGH A METALLIC CAGE, AND   WITH NO RADIOGRAPHIC EVIDENCE OF COMPLICATION.     2. NO SEGMENTAL INSTABILITY BETWEEN THE FLEXION AND EXTENSION IMAGES.     GEK/bela            Read By HUGH JOSEPH MD on Feb 9 2007 10:38AM  : ZPT Transcription Date: Feb 9 2007  2:27PM  THIS DOCUMENT HAS BEEN ELECTRONICALLY SIGNED BY: HUGH JOSEPH MD on   Feb 9 2007  4:15PM                                                                                Diagnosis   Visit Diagnoses     ICD-10-CM   1. Right leg pain M79.604   2. Other chronic pain G89.29           ASSESSMENT:  Ana Maria Kaur Luz Marina 63 y.o. female with report of right groin and right leg pain     Ana Maria was seen today for follow-up.    Diagnoses and all orders for this visit:    Right leg pain  -     DULoxetine (CYMBALTA) 60 MG Cap DR Particles delayed-release capsule; Take 1 Cap by mouth every day for 30  days.    Other chronic pain  -     DULoxetine (CYMBALTA) 60 MG Cap DR Particles delayed-release capsule; Take 1 Cap by mouth every day for 30 days.  -     gabapentin (NEURONTIN) 100 MG Cap; Take 1 Cap by mouth 3 times a day for 30 days.       We discussed continuing with trial of duloxetine for a few more months.      Discussed trial of gabapentin.      Discussed rechecking her vitamin D status as this has been low in the past.  Reprinted request.    Follow-up: 6-8 weeks    Please note that this dictation was created using voice recognition software. I have made every reasonable attempt to correct obvious errors but there may be errors of grammar and content that I may have overlooked prior to finalization of this note.      Joe Vigil MD  Physical Medicine and Rehabilitation  Interventional Spine and Sports Physiatry  [facility]

## 2018-10-19 ENCOUNTER — TELEPHONE (OUTPATIENT)
Dept: PHYSICAL MEDICINE AND REHAB | Facility: MEDICAL CENTER | Age: 63
End: 2018-10-19

## 2018-10-19 DIAGNOSIS — M43.16 SPONDYLOLISTHESIS OF LUMBAR REGION: ICD-10-CM

## 2018-10-19 DIAGNOSIS — M54.50 LOW BACK PAIN WITH RADIATION: ICD-10-CM

## 2018-10-19 DIAGNOSIS — M85.89 OSTEOPENIA OF MULTIPLE SITES: ICD-10-CM

## 2018-10-19 DIAGNOSIS — E55.9 VITAMIN D DEFICIENCY: ICD-10-CM

## 2018-10-19 NOTE — TELEPHONE ENCOUNTER
"  I called patient to inform her regarding her studies and she states she don't be able to do the MRI because money issues and she will be doing her labs next week sometimes.    An/MA      \"Please let Ana Maria know that I am ordering and MRI of her lumbar spine and a bone density study.  Please have her get the vitamin D lab checked.\"  "

## 2018-11-27 ENCOUNTER — APPOINTMENT (OUTPATIENT)
Dept: PHYSICAL MEDICINE AND REHAB | Facility: MEDICAL CENTER | Age: 63
End: 2018-11-27
Payer: COMMERCIAL

## 2019-07-09 ENCOUNTER — OFFICE VISIT (OUTPATIENT)
Dept: URGENT CARE | Facility: PHYSICIAN GROUP | Age: 64
End: 2019-07-09
Payer: COMMERCIAL

## 2019-07-09 VITALS
RESPIRATION RATE: 14 BRPM | WEIGHT: 162 LBS | BODY MASS INDEX: 26.15 KG/M2 | OXYGEN SATURATION: 100 % | DIASTOLIC BLOOD PRESSURE: 80 MMHG | SYSTOLIC BLOOD PRESSURE: 140 MMHG | HEART RATE: 61 BPM | TEMPERATURE: 98.8 F

## 2019-07-09 DIAGNOSIS — H69.92 DYSFUNCTION OF LEFT EUSTACHIAN TUBE: ICD-10-CM

## 2019-07-09 PROCEDURE — 99204 OFFICE O/P NEW MOD 45 MIN: CPT | Performed by: FAMILY MEDICINE

## 2019-07-09 RX ORDER — FEXOFENADINE HCL AND PSEUDOEPHEDRINE HCI 60; 120 MG/1; MG/1
1 TABLET, EXTENDED RELEASE ORAL 2 TIMES DAILY
Qty: 60 TAB | Refills: 0 | Status: SHIPPED | OUTPATIENT
Start: 2019-07-09 | End: 2023-03-15

## 2019-07-09 NOTE — PROGRESS NOTES
Subjective:      Chief Complaint   Patient presents with   • Otalgia     left ear infection,scratchy thorat, not better since 06/14 meds didnt help               Otalgia -  left    The current episode started 3 wk ago. she was treated for strep throat with amoxicillin, and sore throat has resolved, but she continues to c/o left ear congestion.   The  problem occurs constantly. The problem has been unchanged. Associated symptoms include congestion . Pertinent negatives include no abdominal pain, chest pain, chills, fever, headaches, joint swelling, myalgias, nausea, neck pain, rash or visual change. Nothing aggravates the symptoms. She has tried nothing for the symptoms.       Social History   Substance Use Topics   • Smoking status: Former Smoker     Packs/day: 1.00     Years: 10.00     Types: Cigarettes     Quit date: 1/1/1990   • Smokeless tobacco: Never Used   • Alcohol use 10.5 oz/week     21 Standard drinks or equivalent per week      Comment: 3 per day         Past Medical History:   Diagnosis Date   • Arthritis     LEFT FOOT 2ND TOE   • Chronic neck pain    • DVT (deep venous thrombosis) ([facility]) 1986    right leg   • Pain     right leg and neck, right shoulder, groin   • Unspecified hemorrhagic conditions     on coumadin   • Unspecified urinary incontinence        Current Outpatient Prescriptions on File Prior to Visit   Medication Sig Dispense Refill   • warfarin (COUMADIN) 1 MG TABS Take 3 Tabs by mouth every day. 100 Tab 3   • warfarin (COUMADIN) 3 MG Tab Take 3 mg by mouth every day.     • pentoxifylline CR (TRENTAL) 400 MG CR tablet Take 1 Tab by mouth 3 times a day, with meals. (Patient not taking: Reported on 7/9/2019) 90 Tab 11   • ciprofloxacin (CIPRO) 500 MG TABS Take 1 Tab by mouth 2 times a day. (Patient not taking: Reported on 7/9/2019) 20 Tab 0   • lidocaine (LIDODERM) 5 % PTCH Apply 1 Patch to skin as directed every 24 hours. (Patient not taking: Reported on 7/9/2019) 30 Patch 3   • ciprofloxacin  (CIPRO) 500 MG TABS Take 1 Tab by mouth 2 times a day. (Patient not taking: Reported on 7/9/2019) 6 Tab 0   • warfarin (COUMADIN) 5 MG TABS Take 1 Tab by mouth every day. 30 Tab 3   • Diclofenac Sodium (VOLTAREN) 1 % GEL Apply 1 g to skin as directed 2 Times a Day. (Patient not taking: Reported on 7/9/2019) 5 Tube 0   • fesoterodine fumarate (TOVIAZ) 4 MG TB24 Take 1 Tab by mouth every day. (Patient not taking: Reported on 7/9/2019) 30 Each 11   • methadone (DOLOPHINE) 10 MG TABS Take 10 mg by mouth every 3 hours.     • cyclobenzaprine (FLEXERIL) 5 MG tablet Take 5-10 mg by mouth 3 times a day as needed.     • oxycodone-acetaminophen (PERCOCET) 7.5-325 MG per tablet Take 1 Tab by mouth 3 times a day.       No current facility-administered medications on file prior to visit.      Family history was reviewed and not pertinent               Review of Systems   Constitutional: Negative for fever, chills and malaise/fatigue.   Eyes: Negative for vision changes, d/c.    Respiratory: Negative for cough and sputum production.    Cardiovascular: Negative for chest pain and palpitations.   Gastrointestinal: Negative for nausea, vomiting, abdominal pain, diarrhea and constipation.   Genitourinary: Negative for dysuria, urgency and frequency.   Skin: Negative for rash or  itching.   Neurological: Negative for dizziness and tingling.   Psychiatric/Behavioral: Negative for depression.   Hematologic/lymphatic - denies bruising or excessive bleeding  All other systems reviewed and are negative.         Objective:     /80 (BP Location: Left arm, Patient Position: Sitting, BP Cuff Size: Adult)   Pulse 61   Temp 37.1 °C (98.8 °F) (Temporal)   Resp 14   Wt 73.5 kg (162 lb)   SpO2 100%     Physical Exam   Constitutional: Vital signs are normal. She is active. No distress.   HENT:   Head: There is normal jaw occlusion.   Right Ear: External ear normal. Tympanic membrane is normal. No middle ear effusion.   Left Ear: External  ear normal. Tympanic membrane is normal. No middle ear effusion.  Nose:  congestion present. No nasal discharge.   Mouth/Throat: Mucous membranes are moist. No oral lesions. Pharynx erythema present. No oropharyngeal exudate, pharynx swelling or pharynx petechiae. Tonsils are 0 on the right. Tonsils are 0 on the left. No tonsillar exudate.   Eyes: Conjunctivae and EOM are normal. Pupils are equal, round, and reactive to light. Right eye exhibits no discharge. Left eye exhibits no discharge.   Neck: Normal range of motion. Neck supple.  No LAD  Cardiovascular: Normal rate and regular rhythm.  Pulses are palpable.    No murmur heard.  Pulmonary/Chest: Effort normal and breath sounds normal. There is normal air entry. No respiratory distress. no wheezes, rhonchi,  retraction.   Musculoskeletal:   no edema.   Neurological: A/O x 3.   CN 2-12 intact   Skin: Skin is warm. Capillary refill takes less than 3 seconds. No purpura and no rash noted. Patient is not diaphoretic. No jaundice or pallor.   Nursing note and vitals reviewed.              Assessment/Plan:         1. Dysfunction of left eustachian tube     - fexofenadine-pseudoephedrine (ALLEGRA-D)  MG per tablet; Take 1 Tab by mouth 2 times a day.  Dispense: 60 Tab; Refill: 0  - Ciclesonide (OMNARIS) 50 MCG/ACT Suspension; Spray 1 Act in nose every day.  Dispense: 1 Inhaler; Refill: 0  - REFERRAL TO ENT

## 2020-01-07 ENCOUNTER — OFFICE VISIT (OUTPATIENT)
Dept: URGENT CARE | Facility: PHYSICIAN GROUP | Age: 65
End: 2020-01-07

## 2020-01-07 VITALS
DIASTOLIC BLOOD PRESSURE: 70 MMHG | HEART RATE: 76 BPM | HEIGHT: 66 IN | TEMPERATURE: 99.1 F | RESPIRATION RATE: 16 BRPM | OXYGEN SATURATION: 98 % | WEIGHT: 168 LBS | SYSTOLIC BLOOD PRESSURE: 150 MMHG | BODY MASS INDEX: 27 KG/M2

## 2020-01-07 DIAGNOSIS — Z86.19 HISTORY OF CANDIDAL VULVOVAGINITIS: ICD-10-CM

## 2020-01-07 DIAGNOSIS — H92.02 OTALGIA, LEFT: ICD-10-CM

## 2020-01-07 DIAGNOSIS — Z87.09 HISTORY OF STREP PHARYNGITIS: ICD-10-CM

## 2020-01-07 DIAGNOSIS — R59.1 LYMPHADENOPATHY: ICD-10-CM

## 2020-01-07 DIAGNOSIS — J02.9 PHARYNGITIS, UNSPECIFIED ETIOLOGY: ICD-10-CM

## 2020-01-07 LAB
INT CON NEG: NEGATIVE
INT CON POS: POSITIVE
S PYO AG THROAT QL: NORMAL

## 2020-01-07 PROCEDURE — 99214 OFFICE O/P EST MOD 30 MIN: CPT | Performed by: FAMILY MEDICINE

## 2020-01-07 PROCEDURE — 87880 STREP A ASSAY W/OPTIC: CPT | Performed by: FAMILY MEDICINE

## 2020-01-07 RX ORDER — FLUCONAZOLE 150 MG/1
150 TABLET ORAL DAILY
Qty: 2 TAB | Refills: 0 | Status: SHIPPED | OUTPATIENT
Start: 2020-01-07 | End: 2023-03-15

## 2020-01-07 RX ORDER — PENICILLIN V POTASSIUM 500 MG/1
500 TABLET ORAL 2 TIMES DAILY
Qty: 20 TAB | Refills: 0 | Status: SHIPPED | OUTPATIENT
Start: 2020-01-07 | End: 2020-01-17

## 2020-01-07 RX ORDER — LIDOCAINE HYDROCHLORIDE 20 MG/ML
15 SOLUTION OROPHARYNGEAL EVERY 4 HOURS PRN
Qty: 120 ML | Refills: 0 | Status: SHIPPED | OUTPATIENT
Start: 2020-01-07 | End: 2023-03-15

## 2020-01-07 RX ORDER — GABAPENTIN 600 MG/1
600 TABLET ORAL
COMMUNITY

## 2020-01-07 ASSESSMENT — ENCOUNTER SYMPTOMS
NAUSEA: 0
EYE DISCHARGE: 0
MYALGIAS: 0
VOMITING: 0
WEIGHT LOSS: 0
EYE REDNESS: 0

## 2020-01-07 NOTE — PROGRESS NOTES
"Subjective:      Ana Maria Raza is a 64 y.o. female who presents with Otalgia (left ear pain, sore throat x 3 days)            3 days left earache. Severe with swallow.  No drainage from ear.  No recent swimming.  No trauma or barotrauma.  She has had associated sore throat and rhinorrhea.  Hearing is muffled.  No fever. Minimal relief with OTC medications.  No other aggravating or alleviating factors.      Review of Systems   Constitutional: Negative for malaise/fatigue and weight loss.   Eyes: Negative for discharge and redness.   Gastrointestinal: Negative for nausea and vomiting.   Musculoskeletal: Negative for joint pain and myalgias.   Skin: Negative for itching and rash.     .  Medications, Allergies, and current problem list reviewed today in Epic  PM strep pharyngitis last year.  PMH Candida vaginitis with antibiotic use     Objective:     /70 (BP Location: Left arm, Patient Position: Sitting)   Pulse 76   Temp 37.3 °C (99.1 °F) (Temporal)   Resp 16   Ht 1.676 m (5' 6\")   Wt 76.2 kg (168 lb)   SpO2 98%   BMI 27.12 kg/m²      Physical Exam  Constitutional:       General: She is not in acute distress.     Appearance: She is well-developed.   HENT:      Head: Normocephalic and atraumatic.      Right Ear: Tympanic membrane normal.      Left Ear: Tympanic membrane normal.      Mouth/Throat:      Pharynx: Posterior oropharyngeal erythema present.   Eyes:      Conjunctiva/sclera: Conjunctivae normal.   Neck:      Musculoskeletal: Neck supple.   Cardiovascular:      Rate and Rhythm: Normal rate and regular rhythm.      Heart sounds: Normal heart sounds. No murmur.   Pulmonary:      Effort: Pulmonary effort is normal.      Breath sounds: Normal breath sounds. No wheezing.   Lymphadenopathy:      Cervical: Cervical adenopathy present.   Skin:     General: Skin is warm and dry.      Findings: No rash.   Neurological:      Mental Status: She is alert and oriented to person, place, and time. "                 Assessment/Plan:   Rapid strep negative    1. Pharyngitis, unspecified etiology  POCT Rapid Strep A    penicillin v potassium (VEETID) 500 MG Tab    lidocaine (XYLOCAINE) 2 % Solution   2. Lymphadenopathy  POCT Rapid Strep A    penicillin v potassium (VEETID) 500 MG Tab   3. Otalgia, left     4. History of strep pharyngitis  penicillin v potassium (VEETID) 500 MG Tab   5. History of candidal vulvovaginitis  fluconazole (DIFLUCAN) 150 MG tablet     Differential diagnosis, natural history, supportive care, and indications for immediate follow-up discussed at length.     Contingent antibiotic prescription given to patient to fill upon meeting criteria of guidelines discussed.     Monitor INR if initiate antibiotics.

## 2020-07-22 ENCOUNTER — HOSPITAL ENCOUNTER (OUTPATIENT)
Dept: RADIOLOGY | Facility: MEDICAL CENTER | Age: 65
End: 2020-07-22
Attending: FAMILY MEDICINE
Payer: COMMERCIAL

## 2020-07-22 DIAGNOSIS — M25.512 BILATERAL SHOULDER PAIN, UNSPECIFIED CHRONICITY: ICD-10-CM

## 2020-07-22 DIAGNOSIS — M25.511 BILATERAL SHOULDER PAIN, UNSPECIFIED CHRONICITY: ICD-10-CM

## 2020-07-22 PROCEDURE — 73030 X-RAY EXAM OF SHOULDER: CPT | Mod: RT

## 2020-07-22 PROCEDURE — 73030 X-RAY EXAM OF SHOULDER: CPT | Mod: LT

## 2020-12-21 ENCOUNTER — APPOINTMENT (RX ONLY)
Dept: URBAN - METROPOLITAN AREA CLINIC 22 | Facility: CLINIC | Age: 65
Setting detail: DERMATOLOGY
End: 2020-12-21

## 2020-12-21 DIAGNOSIS — D22 MELANOCYTIC NEVI: ICD-10-CM

## 2020-12-21 DIAGNOSIS — Z85.828 PERSONAL HISTORY OF OTHER MALIGNANT NEOPLASM OF SKIN: ICD-10-CM

## 2020-12-21 DIAGNOSIS — Z71.89 OTHER SPECIFIED COUNSELING: ICD-10-CM

## 2020-12-21 DIAGNOSIS — L738 OTHER SPECIFIED DISEASES OF HAIR AND HAIR FOLLICLES: ICD-10-CM

## 2020-12-21 DIAGNOSIS — L663 OTHER SPECIFIED DISEASES OF HAIR AND HAIR FOLLICLES: ICD-10-CM

## 2020-12-21 DIAGNOSIS — D18.0 HEMANGIOMA: ICD-10-CM

## 2020-12-21 DIAGNOSIS — L30.1 DYSHIDROSIS [POMPHOLYX]: ICD-10-CM

## 2020-12-21 DIAGNOSIS — L73.9 FOLLICULAR DISORDER, UNSPECIFIED: ICD-10-CM

## 2020-12-21 DIAGNOSIS — L82.1 OTHER SEBORRHEIC KERATOSIS: ICD-10-CM

## 2020-12-21 DIAGNOSIS — L81.4 OTHER MELANIN HYPERPIGMENTATION: ICD-10-CM

## 2020-12-21 PROBLEM — D18.01 HEMANGIOMA OF SKIN AND SUBCUTANEOUS TISSUE: Status: ACTIVE | Noted: 2020-12-21

## 2020-12-21 PROBLEM — L02.12 FURUNCLE OF NECK: Status: ACTIVE | Noted: 2020-12-21

## 2020-12-21 PROBLEM — D22.62 MELANOCYTIC NEVI OF LEFT UPPER LIMB, INCLUDING SHOULDER: Status: ACTIVE | Noted: 2020-12-21

## 2020-12-21 PROBLEM — D22.61 MELANOCYTIC NEVI OF RIGHT UPPER LIMB, INCLUDING SHOULDER: Status: ACTIVE | Noted: 2020-12-21

## 2020-12-21 PROBLEM — D22.5 MELANOCYTIC NEVI OF TRUNK: Status: ACTIVE | Noted: 2020-12-21

## 2020-12-21 PROCEDURE — ? ADDITIONAL NOTES

## 2020-12-21 PROCEDURE — ? PRESCRIPTION

## 2020-12-21 PROCEDURE — ? TREATMENT REGIMEN

## 2020-12-21 PROCEDURE — 99203 OFFICE O/P NEW LOW 30 MIN: CPT

## 2020-12-21 PROCEDURE — ? COUNSELING

## 2020-12-21 RX ORDER — TRIAMCINOLONE ACETONIDE 1 MG/G
1 CREAM TOPICAL BID PRN
Qty: 1 | Refills: 1 | Status: ERX | COMMUNITY
Start: 2020-12-21

## 2020-12-21 RX ORDER — CLINDAMYCIN PHOSPHATE 10 MG/ML
1 SOLUTION TOPICAL BID
Qty: 1 | Refills: 5 | Status: ERX | COMMUNITY
Start: 2020-12-21

## 2020-12-21 RX ADMIN — CLINDAMYCIN PHOSPHATE 1: 10 SOLUTION TOPICAL at 00:00

## 2020-12-21 RX ADMIN — TRIAMCINOLONE ACETONIDE 1: 1 CREAM TOPICAL at 00:00

## 2020-12-21 ASSESSMENT — LOCATION DETAILED DESCRIPTION DERM
LOCATION DETAILED: EPIGASTRIC SKIN
LOCATION DETAILED: LEFT DISTAL DORSAL FOREARM
LOCATION DETAILED: RIGHT SUPERIOR MEDIAL MIDBACK
LOCATION DETAILED: RIGHT SUPERIOR UPPER BACK
LOCATION DETAILED: RIGHT MEDIAL UPPER BACK
LOCATION DETAILED: MID TRAPEZIAL NECK
LOCATION DETAILED: LEFT PROXIMAL DORSAL FOREARM
LOCATION DETAILED: LEFT ULNAR DORSAL HAND
LOCATION DETAILED: RIGHT RADIAL DORSAL HAND
LOCATION DETAILED: RIGHT ANKLE
LOCATION DETAILED: RIGHT PROXIMAL DORSAL FOREARM

## 2020-12-21 ASSESSMENT — LOCATION SIMPLE DESCRIPTION DERM
LOCATION SIMPLE: ABDOMEN
LOCATION SIMPLE: LEFT FOREARM
LOCATION SIMPLE: LEFT HAND
LOCATION SIMPLE: RIGHT UPPER BACK
LOCATION SIMPLE: TRAPEZIAL NECK
LOCATION SIMPLE: RIGHT ANKLE
LOCATION SIMPLE: RIGHT FOREARM
LOCATION SIMPLE: RIGHT LOWER BACK
LOCATION SIMPLE: RIGHT HAND

## 2020-12-21 ASSESSMENT — BSA RASH: BSA RASH: 9

## 2020-12-21 ASSESSMENT — LOCATION ZONE DERM
LOCATION ZONE: HAND
LOCATION ZONE: NECK
LOCATION ZONE: TRUNK
LOCATION ZONE: ARM
LOCATION ZONE: LEG

## 2021-03-03 DIAGNOSIS — Z23 NEED FOR VACCINATION: ICD-10-CM

## 2021-05-14 ENCOUNTER — APPOINTMENT (OUTPATIENT)
Dept: RADIOLOGY | Facility: MEDICAL CENTER | Age: 66
End: 2021-05-14
Attending: FAMILY MEDICINE
Payer: COMMERCIAL

## 2021-05-14 DIAGNOSIS — Z12.31 VISIT FOR SCREENING MAMMOGRAM: ICD-10-CM

## 2023-01-13 ENCOUNTER — HOSPITAL ENCOUNTER (OUTPATIENT)
Dept: RADIOLOGY | Facility: MEDICAL CENTER | Age: 68
End: 2023-01-13
Payer: COMMERCIAL

## 2023-01-13 DIAGNOSIS — M25.519 ARTHRALGIA OF SHOULDER, UNSPECIFIED LATERALITY: ICD-10-CM

## 2023-01-13 PROCEDURE — 73030 X-RAY EXAM OF SHOULDER: CPT | Mod: RT

## 2023-03-15 ENCOUNTER — APPOINTMENT (OUTPATIENT)
Dept: RADIOLOGY | Facility: MEDICAL CENTER | Age: 68
DRG: 813 | End: 2023-03-15
Attending: EMERGENCY MEDICINE
Payer: COMMERCIAL

## 2023-03-15 ENCOUNTER — HOSPITAL ENCOUNTER (INPATIENT)
Facility: MEDICAL CENTER | Age: 68
LOS: 2 days | DRG: 813 | End: 2023-03-17
Attending: EMERGENCY MEDICINE | Admitting: STUDENT IN AN ORGANIZED HEALTH CARE EDUCATION/TRAINING PROGRAM
Payer: COMMERCIAL

## 2023-03-15 ENCOUNTER — OFFICE VISIT (OUTPATIENT)
Dept: URGENT CARE | Facility: PHYSICIAN GROUP | Age: 68
End: 2023-03-15
Payer: COMMERCIAL

## 2023-03-15 VITALS
SYSTOLIC BLOOD PRESSURE: 82 MMHG | WEIGHT: 152 LBS | TEMPERATURE: 98.3 F | RESPIRATION RATE: 18 BRPM | BODY MASS INDEX: 24.53 KG/M2 | OXYGEN SATURATION: 100 % | DIASTOLIC BLOOD PRESSURE: 60 MMHG | HEART RATE: 63 BPM

## 2023-03-15 DIAGNOSIS — Z79.01 CHRONIC ANTICOAGULATION: ICD-10-CM

## 2023-03-15 DIAGNOSIS — R79.1 SUPRATHERAPEUTIC INR: ICD-10-CM

## 2023-03-15 DIAGNOSIS — K92.0 HEMATEMESIS WITH NAUSEA: ICD-10-CM

## 2023-03-15 DIAGNOSIS — R11.2 NAUSEA AND VOMITING, UNSPECIFIED VOMITING TYPE: ICD-10-CM

## 2023-03-15 DIAGNOSIS — I82.509 CHRONIC DEEP VEIN THROMBOSIS (DVT) OF LOWER EXTREMITY, UNSPECIFIED LATERALITY, UNSPECIFIED VEIN (HCC): ICD-10-CM

## 2023-03-15 DIAGNOSIS — Z98.84 HISTORY OF ROUX-EN-Y GASTRIC BYPASS: ICD-10-CM

## 2023-03-15 DIAGNOSIS — K92.2 UPPER GI BLEED: ICD-10-CM

## 2023-03-15 DIAGNOSIS — I73.9 PERIPHERAL VASCULAR DISEASE (HCC): ICD-10-CM

## 2023-03-15 LAB
ABO + RH BLD: NORMAL
ABO GROUP BLD: NORMAL
ALBUMIN SERPL BCP-MCNC: 3.8 G/DL (ref 3.2–4.9)
ALBUMIN/GLOB SERPL: 1.6 G/DL
ALP SERPL-CCNC: 87 U/L (ref 30–99)
ALT SERPL-CCNC: 14 U/L (ref 2–50)
ANION GAP SERPL CALC-SCNC: 10 MMOL/L (ref 7–16)
APTT PPP: 36.6 SEC (ref 24.7–36)
AST SERPL-CCNC: 18 U/L (ref 12–45)
BASOPHILS # BLD AUTO: 0.5 % (ref 0–1.8)
BASOPHILS # BLD: 0.04 K/UL (ref 0–0.12)
BILIRUB SERPL-MCNC: 0.5 MG/DL (ref 0.1–1.5)
BLD GP AB SCN SERPL QL: NORMAL
BUN SERPL-MCNC: 41 MG/DL (ref 8–22)
CALCIUM ALBUM COR SERPL-MCNC: 9.1 MG/DL (ref 8.5–10.5)
CALCIUM SERPL-MCNC: 8.9 MG/DL (ref 8.5–10.5)
CHLORIDE SERPL-SCNC: 110 MMOL/L (ref 96–112)
CO2 SERPL-SCNC: 22 MMOL/L (ref 20–33)
CREAT SERPL-MCNC: 0.63 MG/DL (ref 0.5–1.4)
EOSINOPHIL # BLD AUTO: 0.03 K/UL (ref 0–0.51)
EOSINOPHIL NFR BLD: 0.4 % (ref 0–6.9)
ERYTHROCYTE [DISTWIDTH] IN BLOOD BY AUTOMATED COUNT: 53.7 FL (ref 35.9–50)
GFR SERPLBLD CREATININE-BSD FMLA CKD-EPI: 97 ML/MIN/1.73 M 2
GLOBULIN SER CALC-MCNC: 2.4 G/DL (ref 1.9–3.5)
GLUCOSE SERPL-MCNC: 120 MG/DL (ref 65–99)
HCT VFR BLD AUTO: 37.1 % (ref 37–47)
HGB BLD-MCNC: 12.1 G/DL (ref 12–16)
IMM GRANULOCYTES # BLD AUTO: 0.04 K/UL (ref 0–0.11)
IMM GRANULOCYTES NFR BLD AUTO: 0.5 % (ref 0–0.9)
INR PPP: 5.4 (ref 0.87–1.13)
LIPASE SERPL-CCNC: 22 U/L (ref 11–82)
LYMPHOCYTES # BLD AUTO: 0.6 K/UL (ref 1–4.8)
LYMPHOCYTES NFR BLD: 7.4 % (ref 22–41)
MCH RBC QN AUTO: 31.1 PG (ref 27–33)
MCHC RBC AUTO-ENTMCNC: 32.6 G/DL (ref 33.6–35)
MCV RBC AUTO: 95.4 FL (ref 81.4–97.8)
MONOCYTES # BLD AUTO: 0.26 K/UL (ref 0–0.85)
MONOCYTES NFR BLD AUTO: 3.2 % (ref 0–13.4)
NEUTROPHILS # BLD AUTO: 7.14 K/UL (ref 2–7.15)
NEUTROPHILS NFR BLD: 88 % (ref 44–72)
NRBC # BLD AUTO: 0 K/UL
NRBC BLD-RTO: 0 /100 WBC
PLATELET # BLD AUTO: 273 K/UL (ref 164–446)
PMV BLD AUTO: 9.7 FL (ref 9–12.9)
POTASSIUM SERPL-SCNC: 4.4 MMOL/L (ref 3.6–5.5)
PROT SERPL-MCNC: 6.2 G/DL (ref 6–8.2)
PROTHROMBIN TIME: 47 SEC (ref 12–14.6)
RBC # BLD AUTO: 3.89 M/UL (ref 4.2–5.4)
RH BLD: NORMAL
SODIUM SERPL-SCNC: 142 MMOL/L (ref 135–145)
WBC # BLD AUTO: 8.1 K/UL (ref 4.8–10.8)

## 2023-03-15 PROCEDURE — 99223 1ST HOSP IP/OBS HIGH 75: CPT | Mod: AI | Performed by: STUDENT IN AN ORGANIZED HEALTH CARE EDUCATION/TRAINING PROGRAM

## 2023-03-15 PROCEDURE — 96365 THER/PROPH/DIAG IV INF INIT: CPT

## 2023-03-15 PROCEDURE — 83690 ASSAY OF LIPASE: CPT

## 2023-03-15 PROCEDURE — 86850 RBC ANTIBODY SCREEN: CPT

## 2023-03-15 PROCEDURE — 700105 HCHG RX REV CODE 258: Performed by: STUDENT IN AN ORGANIZED HEALTH CARE EDUCATION/TRAINING PROGRAM

## 2023-03-15 PROCEDURE — 86900 BLOOD TYPING SEROLOGIC ABO: CPT

## 2023-03-15 PROCEDURE — 770006 HCHG ROOM/CARE - MED/SURG/GYN SEMI*

## 2023-03-15 PROCEDURE — 96375 TX/PRO/DX INJ NEW DRUG ADDON: CPT

## 2023-03-15 PROCEDURE — 99215 OFFICE O/P EST HI 40 MIN: CPT

## 2023-03-15 PROCEDURE — 96366 THER/PROPH/DIAG IV INF ADDON: CPT

## 2023-03-15 PROCEDURE — 80053 COMPREHEN METABOLIC PANEL: CPT

## 2023-03-15 PROCEDURE — 99222 1ST HOSP IP/OBS MODERATE 55: CPT | Performed by: INTERNAL MEDICINE

## 2023-03-15 PROCEDURE — 700105 HCHG RX REV CODE 258

## 2023-03-15 PROCEDURE — 700111 HCHG RX REV CODE 636 W/ 250 OVERRIDE (IP): Performed by: EMERGENCY MEDICINE

## 2023-03-15 PROCEDURE — 85610 PROTHROMBIN TIME: CPT

## 2023-03-15 PROCEDURE — 36415 COLL VENOUS BLD VENIPUNCTURE: CPT

## 2023-03-15 PROCEDURE — 700111 HCHG RX REV CODE 636 W/ 250 OVERRIDE (IP)

## 2023-03-15 PROCEDURE — 74174 CTA ABD&PLVS W/CONTRAST: CPT

## 2023-03-15 PROCEDURE — 85025 COMPLETE CBC W/AUTO DIFF WBC: CPT

## 2023-03-15 PROCEDURE — 85730 THROMBOPLASTIN TIME PARTIAL: CPT

## 2023-03-15 PROCEDURE — 700105 HCHG RX REV CODE 258: Performed by: EMERGENCY MEDICINE

## 2023-03-15 PROCEDURE — 99285 EMERGENCY DEPT VISIT HI MDM: CPT

## 2023-03-15 PROCEDURE — 86901 BLOOD TYPING SEROLOGIC RH(D): CPT

## 2023-03-15 PROCEDURE — A9270 NON-COVERED ITEM OR SERVICE: HCPCS | Performed by: STUDENT IN AN ORGANIZED HEALTH CARE EDUCATION/TRAINING PROGRAM

## 2023-03-15 PROCEDURE — 700117 HCHG RX CONTRAST REV CODE 255: Performed by: EMERGENCY MEDICINE

## 2023-03-15 PROCEDURE — 700102 HCHG RX REV CODE 250 W/ 637 OVERRIDE(OP): Performed by: STUDENT IN AN ORGANIZED HEALTH CARE EDUCATION/TRAINING PROGRAM

## 2023-03-15 PROCEDURE — C9113 INJ PANTOPRAZOLE SODIUM, VIA: HCPCS | Performed by: EMERGENCY MEDICINE

## 2023-03-15 RX ORDER — SODIUM CHLORIDE 9 MG/ML
INJECTION, SOLUTION INTRAVENOUS CONTINUOUS
Status: DISCONTINUED | OUTPATIENT
Start: 2023-03-15 | End: 2023-03-17 | Stop reason: HOSPADM

## 2023-03-15 RX ORDER — PANTOPRAZOLE SODIUM 40 MG/10ML
40 INJECTION, POWDER, LYOPHILIZED, FOR SOLUTION INTRAVENOUS 2 TIMES DAILY
Status: DISCONTINUED | OUTPATIENT
Start: 2023-03-16 | End: 2023-03-17 | Stop reason: HOSPADM

## 2023-03-15 RX ORDER — GABAPENTIN 300 MG/1
600 CAPSULE ORAL
Status: DISCONTINUED | OUTPATIENT
Start: 2023-03-15 | End: 2023-03-17 | Stop reason: HOSPADM

## 2023-03-15 RX ORDER — ACETAMINOPHEN 325 MG/1
650 TABLET ORAL EVERY 6 HOURS PRN
Status: DISCONTINUED | OUTPATIENT
Start: 2023-03-15 | End: 2023-03-17 | Stop reason: HOSPADM

## 2023-03-15 RX ORDER — ONDANSETRON 2 MG/ML
4 INJECTION INTRAMUSCULAR; INTRAVENOUS ONCE
Status: COMPLETED | OUTPATIENT
Start: 2023-03-15 | End: 2023-03-15

## 2023-03-15 RX ORDER — ONDANSETRON 2 MG/ML
4 INJECTION INTRAMUSCULAR; INTRAVENOUS EVERY 4 HOURS PRN
Status: DISCONTINUED | OUTPATIENT
Start: 2023-03-15 | End: 2023-03-17 | Stop reason: HOSPADM

## 2023-03-15 RX ORDER — SODIUM CHLORIDE 9 MG/ML
1000 INJECTION, SOLUTION INTRAVENOUS ONCE
Status: COMPLETED | OUTPATIENT
Start: 2023-03-15 | End: 2023-03-15

## 2023-03-15 RX ORDER — OXYCODONE HYDROCHLORIDE 5 MG/1
2.5 TABLET ORAL
Status: DISCONTINUED | OUTPATIENT
Start: 2023-03-15 | End: 2023-03-17 | Stop reason: HOSPADM

## 2023-03-15 RX ORDER — MELOXICAM 15 MG/1
15 TABLET ORAL EVERY MORNING
COMMUNITY

## 2023-03-15 RX ORDER — ONDANSETRON 4 MG/1
4 TABLET, ORALLY DISINTEGRATING ORAL EVERY 4 HOURS PRN
Status: DISCONTINUED | OUTPATIENT
Start: 2023-03-15 | End: 2023-03-17 | Stop reason: HOSPADM

## 2023-03-15 RX ORDER — OXYCODONE HYDROCHLORIDE 5 MG/1
5 TABLET ORAL
Status: DISCONTINUED | OUTPATIENT
Start: 2023-03-15 | End: 2023-03-17 | Stop reason: HOSPADM

## 2023-03-15 RX ORDER — AMOXICILLIN 250 MG
2 CAPSULE ORAL 2 TIMES DAILY
Status: DISCONTINUED | OUTPATIENT
Start: 2023-03-15 | End: 2023-03-17 | Stop reason: HOSPADM

## 2023-03-15 RX ORDER — BISACODYL 10 MG
10 SUPPOSITORY, RECTAL RECTAL
Status: DISCONTINUED | OUTPATIENT
Start: 2023-03-15 | End: 2023-03-17 | Stop reason: HOSPADM

## 2023-03-15 RX ORDER — MORPHINE SULFATE 4 MG/ML
2 INJECTION INTRAVENOUS
Status: DISCONTINUED | OUTPATIENT
Start: 2023-03-15 | End: 2023-03-17 | Stop reason: HOSPADM

## 2023-03-15 RX ORDER — POLYETHYLENE GLYCOL 3350 17 G/17G
1 POWDER, FOR SOLUTION ORAL
Status: DISCONTINUED | OUTPATIENT
Start: 2023-03-15 | End: 2023-03-17 | Stop reason: HOSPADM

## 2023-03-15 RX ADMIN — PANTOPRAZOLE SODIUM 8 MG/HR: 40 INJECTION, POWDER, FOR SOLUTION INTRAVENOUS at 13:20

## 2023-03-15 RX ADMIN — SODIUM CHLORIDE: 9 INJECTION, SOLUTION INTRAVENOUS at 15:58

## 2023-03-15 RX ADMIN — IOHEXOL 100 ML: 350 INJECTION, SOLUTION INTRAVENOUS at 13:03

## 2023-03-15 RX ADMIN — OXYCODONE HYDROCHLORIDE 2.5 MG: 5 TABLET ORAL at 22:06

## 2023-03-15 RX ADMIN — PHYTONADIONE 10 MG: 10 INJECTION, EMULSION INTRAMUSCULAR; INTRAVENOUS; SUBCUTANEOUS at 16:06

## 2023-03-15 RX ADMIN — PANTOPRAZOLE SODIUM 80 MG: 40 INJECTION, POWDER, FOR SOLUTION INTRAVENOUS at 12:36

## 2023-03-15 RX ADMIN — PROTHROMBIN, COAGULATION FACTOR VII HUMAN, COAGULATION FACTOR IX HUMAN, COAGULATION FACTOR X HUMAN, PROTEIN C, PROTEIN S HUMAN, AND WATER 2000 UNITS: KIT at 14:34

## 2023-03-15 RX ADMIN — GABAPENTIN 600 MG: 300 CAPSULE ORAL at 20:35

## 2023-03-15 RX ADMIN — ONDANSETRON 4 MG: 2 INJECTION INTRAMUSCULAR; INTRAVENOUS at 12:18

## 2023-03-15 RX ADMIN — SODIUM CHLORIDE 1000 ML: 9 INJECTION, SOLUTION INTRAVENOUS at 12:31

## 2023-03-15 ASSESSMENT — ENCOUNTER SYMPTOMS
VOMITING: 1
DIZZINESS: 0
VOMITING: 0
NAUSEA: 1
NAUSEA: 1
BLOOD IN STOOL: 0
WEAKNESS: 1
NAUSEA: 0
SENSORY CHANGE: 0
SHORTNESS OF BREATH: 0
WHEEZING: 0
CHILLS: 0
FOCAL WEAKNESS: 0
BACK PAIN: 1
INSOMNIA: 0
HEARTBURN: 0
FEVER: 0
BLURRED VISION: 0
FEVER: 0
ABDOMINAL PAIN: 1
BACK PAIN: 0
CONSTIPATION: 0
PALPITATIONS: 0
MYALGIAS: 0
SHORTNESS OF BREATH: 0
FALLS: 0
COUGH: 0
LOSS OF CONSCIOUSNESS: 0
EYE PAIN: 0
FLANK PAIN: 1
VOMITING: 1
ABDOMINAL PAIN: 1
HEADACHES: 0
DIARRHEA: 0
DOUBLE VISION: 0

## 2023-03-15 ASSESSMENT — COGNITIVE AND FUNCTIONAL STATUS - GENERAL
SUGGESTED CMS G CODE MODIFIER MOBILITY: CH
SUGGESTED CMS G CODE MODIFIER DAILY ACTIVITY: CH
DAILY ACTIVITIY SCORE: 24
MOBILITY SCORE: 24

## 2023-03-15 ASSESSMENT — LIFESTYLE VARIABLES
TOTAL SCORE: 0
EVER HAD A DRINK FIRST THING IN THE MORNING TO STEADY YOUR NERVES TO GET RID OF A HANGOVER: NO
ALCOHOL_USE: NO
HAVE PEOPLE ANNOYED YOU BY CRITICIZING YOUR DRINKING: NO
SUBSTANCE_ABUSE: 0
HOW MANY TIMES IN THE PAST YEAR HAVE YOU HAD 5 OR MORE DRINKS IN A DAY: 0
TOTAL SCORE: 0
AVERAGE NUMBER OF DAYS PER WEEK YOU HAVE A DRINK CONTAINING ALCOHOL: 0
ON A TYPICAL DAY WHEN YOU DRINK ALCOHOL HOW MANY DRINKS DO YOU HAVE: 0
EVER FELT BAD OR GUILTY ABOUT YOUR DRINKING: NO
CONSUMPTION TOTAL: NEGATIVE
HAVE YOU EVER FELT YOU SHOULD CUT DOWN ON YOUR DRINKING: NO
TOTAL SCORE: 0

## 2023-03-15 ASSESSMENT — PATIENT HEALTH QUESTIONNAIRE - PHQ9
1. LITTLE INTEREST OR PLEASURE IN DOING THINGS: NOT AT ALL
SUM OF ALL RESPONSES TO PHQ9 QUESTIONS 1 AND 2: 0
2. FEELING DOWN, DEPRESSED, IRRITABLE, OR HOPELESS: NOT AT ALL

## 2023-03-15 ASSESSMENT — FIBROSIS 4 INDEX: FIB4 SCORE: 1.18

## 2023-03-15 ASSESSMENT — PAIN DESCRIPTION - PAIN TYPE
TYPE: ACUTE PAIN

## 2023-03-15 ASSESSMENT — PAIN SCALES - WONG BAKER: WONGBAKER_NUMERICALRESPONSE: HURTS JUST A LITTLE BIT

## 2023-03-15 NOTE — ED NOTES
Additional PIV placed for medication administration.   Pt medicated per MAR.   Pt provided with warm blankets and call light is within reach.

## 2023-03-15 NOTE — CARE PLAN
The patient is Stable - Low risk of patient condition declining or worsening    Shift Goals  Clinical Goals: pain management, n/v control  Patient Goals: pain management, rent    Progress made toward(s) clinical / shift goals:  pt. Vital signs stable at this time, no active n/v noted. Pain controlled with prn pain medications.       Problem: Pain - Standard  Goal: Alleviation of pain or a reduction in pain to the patient’s comfort goal  Outcome: Progressing     Problem: Knowledge Deficit - Standard  Goal: Patient and family/care givers will demonstrate understanding of plan of care, disease process/condition, diagnostic tests and medications  Outcome: Progressing       Patient is not progressing towards the following goals:

## 2023-03-15 NOTE — ED TRIAGE NOTES
Patient BIB EMS from urgent care for  Chief Complaint   Patient presents with    Abdominal Pain     LLQ    Blood in Vomit     Bright red blood x4    Pt last took coumadin this morning.

## 2023-03-15 NOTE — ED PROVIDER NOTES
ER Provider Note    Scribed for Adam Kapoor M.d. by Arely Domingo. 3/15/2023  11:44 AM    Primary Care Provider: Philip Conley M.D.    CHIEF COMPLAINT  Chief Complaint   Patient presents with    Abdominal Pain     LLQ    Blood in Vomit     Bright red blood x4     LIMITATION TO HISTORY   Select: : None    HPI/ROS  OUTSIDE HISTORIAN(S):  None    EXTERNAL RECORDS REVIEWED  Review of records shows that the pt was last in clinic in Jan 202 for unrelated complaints. No prior hospital admissions. Pt has history of gastric bypass and DVT and is on warfarin.     Ana Maria Raza is a 67 y.o. female with a history of pancreatitis and gastric bypass who presents to the ED for left lower quadrant abdominal pain onset this morning. The patient states that she takes warfarin regularly and that her dosage was recently adjusted to 6 mg, 7 days a week. She reports that her pain is not as bad as her previous episode of pancreatitis. Patient adds that she took two Advil yesterday. She also experiences bloody stools, nausea, vomiting, and bright red blood in her vomit. Patient denies fever. Denies history of ulcers. No alleviating or exacerbating factors reported.     PAST MEDICAL HISTORY  Past Medical History:   Diagnosis Date    Arthritis     LEFT FOOT 2ND TOE    Chronic neck pain     DVT (deep venous thrombosis) (HCC) 1986    right leg    Pain     right leg and neck, right shoulder, groin    Unspecified hemorrhagic conditions     on coumadin    Unspecified urinary incontinence      SURGICAL HISTORY  Past Surgical History:   Procedure Laterality Date    HAMMERTOE CORRECTION  3/5/2014    Performed by ULYSSES JensenPGAYATRI at SURGERY [facility] ORS    SHOULDER ARTHROSCOPY W/ ROTATOR CUFF REPAIR  2/23/2012    Performed by DIMAS BARRETO at [facility] ORS    SHOULDER DECOMPRESSION ARTHROSCOPIC  2/23/2012    Performed by DIMAS BARRETO at [facility] ORS    CLAVICLE DISTAL EXCISION   2/23/2012    Performed by DIMAS BARRETO at SURGERY [facility] ORS    SHOULDER ARTHROSCOPY W/ BICIPITAL TENODESIS REPAIR  2/23/2012    Performed by DIMAS BARRETO at SURGERY [facility] ORS    TOE FUSION  8/30/2010    left foot; Performed by DINO MELO at SURGERY [facility] ORS    LAMINOTOMY  2006    Anterior cervical fusion x 2 disc's    GASTRIC BYPASS LAPAROSCOPIC  1995    OTHER  1986    right leg surgery (for fistula's)    CHOLECYSTECTOMY  1979     FAMILY HISTORY  Family History   Problem Relation Age of Onset    Cancer Father         brain tumor    Heart Disease Unknown     Hypertension Unknown     Stroke Unknown      SOCIAL HISTORY   reports that she quit smoking about 33 years ago. Her smoking use included cigarettes. She has a 10.00 pack-year smoking history. She has never used smokeless tobacco. She reports current alcohol use of about 10.5 oz per week. She reports that she does not use drugs.    CURRENT MEDICATIONS  Previous Medications    CICLESONIDE (OMNARIS) 50 MCG/ACT SUSPENSION    Spray 1 Act in nose every day.    CIPROFLOXACIN (CIPRO) 500 MG TABS    Take 1 Tab by mouth 2 times a day.    CIPROFLOXACIN (CIPRO) 500 MG TABS    Take 1 Tab by mouth 2 times a day.    CYCLOBENZAPRINE (FLEXERIL) 5 MG TABLET    Take 5-10 mg by mouth 3 times a day as needed.    DICLOFENAC SODIUM (VOLTAREN) 1 % GEL    Apply 1 g to skin as directed 2 Times a Day.    FESOTERODINE FUMARATE (TOVIAZ) 4 MG TB24    Take 1 Tab by mouth every day.    FEXOFENADINE-PSEUDOEPHEDRINE (ALLEGRA-D)  MG PER TABLET    Take 1 Tab by mouth 2 times a day.    FLUCONAZOLE (DIFLUCAN) 150 MG TABLET    Take 1 Tab by mouth every day.    GABAPENTIN (NEURONTIN) 300 MG CAP    Take 300 mg by mouth 3 times a day.    LIDOCAINE (LIDODERM) 5 % PTCH    Apply 1 Patch to skin as directed every 24 hours.    LIDOCAINE (XYLOCAINE) 2 % SOLUTION    Take 15 mL by mouth every four hours as needed for Throat/Mouth Pain. Gargle and spit every 4 hours as  "needed    METHADONE (DOLOPHINE) 10 MG TABS    Take 10 mg by mouth every 3 hours.    OXYCODONE-ACETAMINOPHEN (PERCOCET) 7.5-325 MG PER TABLET    Take 1 Tab by mouth 3 times a day.    PENTOXIFYLLINE CR (TRENTAL) 400 MG CR TABLET    Take 1 Tab by mouth 3 times a day, with meals.    WARFARIN (COUMADIN) 1 MG TABS    Take 3 Tabs by mouth every day.    WARFARIN (COUMADIN) 3 MG TAB    Take 3 mg by mouth every day.    WARFARIN (COUMADIN) 5 MG TABS    Take 1 Tab by mouth every day.     ALLERGIES  Nkda [no known drug allergy]    PHYSICAL EXAM  /66   Pulse 71   Resp 16   Ht 1.676 m (5' 6\")   Wt 68.9 kg (152 lb)   SpO2 97%   BMI 24.53 kg/m²   Nursing note and vitals reviewed.  Constitutional: Well-developed and well-nourished.  Somewhat anxious.  Does not demonstrate stigmata of end-stage liver disease.  HENT: Head is normocephalic and atraumatic. Oropharynx is clear and moist without exudate or erythema.   Eyes: Pupils are equal, round, and reactive to light. Conjunctiva are normal.   Cardiovascular: Normal rate and regular rhythm. No murmur heard. Normal radial pulses.  Pulmonary/Chest: Breath sounds normal. No wheezes or rales.   Abdominal: Soft, non-distended. Unable to elicit any abdominal tenderness. Normal active bowel sounds. No guarding or peritoneal signs. No palpable abdominal aortic aneurysm. No masses. No tenderness at McBurney's point.   Musculoskeletal: Extremities exhibit normal range of motion without edema or tenderness.   Neurological: Awake, alert and oriented to person, place, and time. No focal deficits noted.  Skin: Skin is warm and dry. No rash.  Psychiatric: Normal mood and affect. Appropriate for clinical situation    DIAGNOSTIC STUDIES & PROCEDURES    Labs:   Results for orders placed or performed during the hospital encounter of 03/15/23   CBC WITH DIFFERENTIAL   Result Value Ref Range    WBC 8.1 4.8 - 10.8 K/uL    RBC 3.89 (L) 4.20 - 5.40 M/uL    Hemoglobin 12.1 12.0 - 16.0 g/dL    " Hematocrit 37.1 37.0 - 47.0 %    MCV 95.4 81.4 - 97.8 fL    MCH 31.1 27.0 - 33.0 pg    MCHC 32.6 (L) 33.6 - 35.0 g/dL    RDW 53.7 (H) 35.9 - 50.0 fL    Platelet Count 273 164 - 446 K/uL    MPV 9.7 9.0 - 12.9 fL    Neutrophils-Polys 88.00 (H) 44.00 - 72.00 %    Lymphocytes 7.40 (L) 22.00 - 41.00 %    Monocytes 3.20 0.00 - 13.40 %    Eosinophils 0.40 0.00 - 6.90 %    Basophils 0.50 0.00 - 1.80 %    Immature Granulocytes 0.50 0.00 - 0.90 %    Nucleated RBC 0.00 /100 WBC    Neutrophils (Absolute) 7.14 2.00 - 7.15 K/uL    Lymphs (Absolute) 0.60 (L) 1.00 - 4.80 K/uL    Monos (Absolute) 0.26 0.00 - 0.85 K/uL    Eos (Absolute) 0.03 0.00 - 0.51 K/uL    Baso (Absolute) 0.04 0.00 - 0.12 K/uL    Immature Granulocytes (abs) 0.04 0.00 - 0.11 K/uL    NRBC (Absolute) 0.00 K/uL   COMP METABOLIC PANEL   Result Value Ref Range    Sodium 142 135 - 145 mmol/L    Potassium 4.4 3.6 - 5.5 mmol/L    Chloride 110 96 - 112 mmol/L    Co2 22 20 - 33 mmol/L    Anion Gap 10.0 7.0 - 16.0    Glucose 120 (H) 65 - 99 mg/dL    Bun 41 (H) 8 - 22 mg/dL    Creatinine 0.63 0.50 - 1.40 mg/dL    Calcium 8.9 8.5 - 10.5 mg/dL    AST(SGOT) 18 12 - 45 U/L    ALT(SGPT) 14 2 - 50 U/L    Alkaline Phosphatase 87 30 - 99 U/L    Total Bilirubin 0.5 0.1 - 1.5 mg/dL    Albumin 3.8 3.2 - 4.9 g/dL    Total Protein 6.2 6.0 - 8.2 g/dL    Globulin 2.4 1.9 - 3.5 g/dL    A-G Ratio 1.6 g/dL   LIPASE   Result Value Ref Range    Lipase 22 11 - 82 U/L   COD (ADULT)   Result Value Ref Range    ABO Grouping Only A     Rh Grouping Only POS     Antibody Screen-Cod NEG    CORRECTED CALCIUM   Result Value Ref Range    Correct Calcium 9.1 8.5 - 10.5 mg/dL   ESTIMATED GFR   Result Value Ref Range    GFR (CKD-EPI) 97 >60 mL/min/1.73 m 2     All labs reviewed by me.    Radiology:   The attending Emergency Physician has independently interpreted the diagnostic imaging associated with this visit and is awaiting the final reading from the radiologist, which will be displayed  below.    Preliminary interpretation is a follows: No acute abnormality noted  Radiologist interpretation:    CTA ABDOMEN PELVIS W & W/O POST PROCESS   Final Result      1.  No evidence of active gastrointestinal bleeding.   2.  Status post gastric bypass.   3.  Atherosclerosis.   4.  Gallbladder is nonvisualized and may be surgically absent.   5.  Mild dilation of the common bile duct 8 mm, stable from the previous exam and likely physiologic status post cholecystectomy.   6.  Left parapelvic renal cysts. No follow up imaging is recommended per consensus guidelines of the 2019 ACR Incidental Findings Committee for probably benign incidental simple appearing renal cystic lesion(s) based on imaging criteria.   7.  3.4 cm right adnexal cyst. Follow-up pelvic ultrasound is recommended for further characterization as neoplasm cannot definitely be excluded.         COURSE & MEDICAL DECISION MAKING    ED Observation Status? No; Patient does not meet criteria for ED Observation.     INITIAL ASSESSMENT AND PLAN  Care Narrative:       11:44 AM - Patient seen and evaluated at bedside. Ana Maria Raza is a 67 y.o. female with a history of pancreatitis and gastric bypass, who presents with left lower quadrant abdominal pain. Patient will be treated with NS infusion 1L, Protonix 80 mg in NS x2, and Zofran injection 4 mg for her symptoms. Ordered CTA Abdomen Pelvis w/ and w/o, COD, APTT, Prothrombin Time, Lipase, CMP, and CBC w/ Diff to evaluate. Discussed plan of care, including likely admission. She understands and agrees to the plan of care. Differential diagnoses include but are not limited to: Upper GI Bleed vs Anemia vs Supratherapeutic INR.     The patient presents today with hematemesis.  Evidence of a upper GI bleed.  With her history of gastric bypass I suspect that she has a marginal ulcer.  Patient will be treated with Protonix and a Protonix drip.  Recent history of supratherapeutic INR.  INR will be  rechecked today.  Will consider reversal if elevated.    Laboratory studies today demonstrated normal hemoglobin.  INR is supratherapeutic.  The patient will be treated with Kcentra.    Consulted gastroenterology.  Dr. Wanda amador will consult.    Patient has been rechecked in the emergency department on multiple occasions.  Has remained stable.  No evidence of clinical deterioration.  Being reversed.  COD obtained should the patient require blood transfusion.    1:59 PM  - I discussed the patient's case and the above findings with Dr. Archer (Hospitalist) who agrees to evaluate the patient for hospitalization.    HYDRATION: Based on the patient's presentation of Acute Vomiting the patient was given IV fluids. IV Hydration was used because oral hydration was not adequate alone. Upon recheck following hydration, the patient was improved.                 DISPOSITION AND DISCUSSIONS  I have discussed management of the patient with the following physicians and MALCOM's: Dr. Archer (Hopitalist), Dr. Stiles    Discussion of management with other [facility] or appropriate source(s): Pharmacy regarding Kcentra      DISPOSITION:  Patient will be hospitalized by Dr. archer in guarded condition.    CRITICAL CARE  I provided critical care services, which included medication orders, frequent reevaluations of the patient's condition and response to treatment, ordering and reviewing test results, and discussing the case with various consultants.  The critical care time associated with the care of the patient was 35 minutes. Review chart for interventions. This time is exclusive of any other billable procedures.        FINAL IMPRESSION   1. Nausea and vomiting, unspecified vomiting type    2. Hematemesis with nausea    3. Chronic anticoagulation    4. History of Dai-en-Y gastric bypass    5. Supratherapeutic INR        IArely), miquel scribing for, and in the presence of, Adam Kapoor M.D..    Electronically signed by: Arely  Jose L (Scribe), 3/15/2023    IAdam M.D. personally performed the services described in this documentation, as scribed by Arely Domingo in my presence, and it is both accurate and complete.    The note accurately reflects work and decisions made by me.  Adam Kapoor M.D.  3/15/2023  1:58 PM

## 2023-03-15 NOTE — ASSESSMENT & PLAN NOTE
History of,  On warfarin chronically  Holding warfarin for now in setting of upper GI bleed  Resume when appropriate

## 2023-03-15 NOTE — ASSESSMENT & PLAN NOTE
INR 5.4 on presentation, given kcentra and vitamin K in setting of active upper GI bleeding  Monitor INR  Hold warfarin for now  Patient with supratherapeutic INR on admission which is now resolved.  INR 1.26.  Patient previously was on a NOAC however due to cost is not currently on 1.  Awaiting GI clearance for restarting of anticoagulation.  We will reach out to case management and pharmacy to see if a NOAC is affordable with her insurance.

## 2023-03-15 NOTE — PROGRESS NOTES
Subjective:     CHIEF COMPLAINT  Chief Complaint   Patient presents with    Emesis     Vomiting blood this morning , left lower side pain that radiates to back .        HPI  Ana Maria Raza is a very pleasant 67 y.o. female who presents with vomiting of blood since this morning and left lower side pain that radiates into her back. Patient is on Warfarin currently. Has been vomiting bright red blood mixed with mucus since this morning. Experiencing nausea. No reported CP or SOB.     REVIEW OF SYSTEMS  Review of Systems   Constitutional:  Negative for fever.   Respiratory:  Negative for shortness of breath.    Cardiovascular:  Positive for chest pain.   Gastrointestinal:  Positive for abdominal pain, nausea and vomiting (Bright red blood).   Musculoskeletal:  Positive for back pain.   Neurological:  Positive for weakness.     PAST MEDICAL HISTORY  Patient Active Problem List    Diagnosis Date Noted    Atherosclerosis of native artery of right lower extremity with intermittent claudication ([facility]) 09/07/2017    Iliac artery occlusion, right ([facility]) 09/07/2017    Chronic anticoagulation 10/15/2014    Hammertoe 03/05/2014    DVT (deep venous thrombosis) ([facility])     Chronic neck pain        SURGICAL HISTORY   has a past surgical history that includes laminotomy (2006); cholecystectomy (1979); gastric bypass laparoscopic (1995); other (1986); toe fusion (8/30/2010); shoulder arthroscopy w/ rotator cuff repair (2/23/2012); shoulder decompression arthroscopic (2/23/2012); clavicle distal excision (2/23/2012); shoulder arthroscopy w/ bicipital tenodesis repair (2/23/2012); and hammertoe correction (3/5/2014).    ALLERGIES  Allergies   Allergen Reactions    Nkda [No Known Drug Allergy]        CURRENT MEDICATIONS  Home Medications       Reviewed by Mallory Roberson P.A.-C. (Physician Assistant) on 03/15/23 at 1035  Med List Status: <None>     Medication Last Dose Status   Ciclesonide (OMNARIS) 50 MCG/ACT Suspension Not  Taking Active   ciprofloxacin (CIPRO) 500 MG TABS Not Taking Active   ciprofloxacin (CIPRO) 500 MG TABS Not Taking Active   cyclobenzaprine (FLEXERIL) 5 mg tablet Not Taking Active   Diclofenac Sodium (VOLTAREN) 1 % GEL Taking Active   fesoterodine fumarate (TOVIAZ) 4 MG TB24  Active   fexofenadine-pseudoephedrine (ALLEGRA-D)  MG per tablet  Active   fluconazole (DIFLUCAN) 150 MG tablet Not Taking Active   gabapentin (NEURONTIN) 300 MG Cap Taking Active   lidocaine (LIDODERM) 5 % PTCH Not Taking Active   lidocaine (XYLOCAINE) 2 % Solution Not Taking Active   methadone (DOLOPHINE) 10 MG TABS Not Taking Active   oxycodone-acetaminophen (PERCOCET) 7.5-325 MG per tablet Taking Active   pentoxifylline CR (TRENTAL) 400 MG CR tablet Not Taking Active   warfarin (COUMADIN) 1 MG TABS Taking Active   warfarin (COUMADIN) 3 MG Tab Taking Active   warfarin (COUMADIN) 5 MG TABS Taking Active                    SOCIAL HISTORY  Social History     Tobacco Use    Smoking status: Former     Packs/day: 1.00     Years: 10.00     Pack years: 10.00     Types: Cigarettes     Quit date: 1990     Years since quittin.2    Smokeless tobacco: Never   Substance and Sexual Activity    Alcohol use: Yes     Alcohol/week: 10.5 oz     Types: 21 Standard drinks or equivalent per week     Comment: 3 per day    Drug use: No    Sexual activity: Not on file       FAMILY HISTORY  Family History   Problem Relation Age of Onset    Cancer Father         brain tumor    Heart Disease Unknown     Hypertension Unknown     Stroke Unknown           Objective:     VITAL SIGNS: BP 98/60 (BP Location: Right arm, Patient Position: Sitting, BP Cuff Size: Adult)   Pulse 63   Temp 36.8 °C (98.3 °F) (Temporal)   Resp 18   Wt 68.9 kg (152 lb)   SpO2 100%   BMI 24.53 kg/m²     PHYSICAL EXAM  Physical Exam  Constitutional:       General: She is in acute distress.      Appearance: She is normal weight. She is ill-appearing.   HENT:      Head: Normocephalic  and atraumatic.   Eyes:      Conjunctiva/sclera: Conjunctivae normal.   Cardiovascular:      Rate and Rhythm: Normal rate and regular rhythm.      Heart sounds: Normal heart sounds.   Pulmonary:      Effort: Pulmonary effort is normal.      Breath sounds: Normal breath sounds. No wheezing, rhonchi or rales.   Neurological:      General: No focal deficit present.      Mental Status: She is oriented to person, place, and time.       Assessment/Plan:     1. Hematemesis with nausea  -Patient sent to [facility] for further management vai ambulance    MDM/Comments:  Patient likely has a GI bleed and a possible stomach ulcer. Patient instructed to be seen at ER immediately. Patient refused ambulance and opted to be driven by . Patient became pale and lightheaded in car,  returned to office and 911 was called. Patient escorted  via ambulance. Patient needs to be immediately evaluated and given treatment.     Differential diagnosis, natural history, supportive care, and indications for immediate follow-up discussed. All questions answered. Patient agrees with the plan of care.    Follow-up as needed if symptoms worsen or fail to improve to PCP, Urgent care or Emergency Room.    I have personally reviewed prior external notes and test results pertinent to today's visit.  I have independently reviewed and interpreted all diagnostics ordered during this urgent care acute visit.   Discussed management options (risks,benefits, and alternatives to treatment). Pt expresses understanding and the treatment plan was agreed upon. Questions were encouraged and answered to pt's satisfaction.    Please note that this dictation was created using voice recognition software. I have made a reasonable attempt to correct obvious errors, but I expect that there are errors of grammar and possibly content that I did not discover before finalizing the note.

## 2023-03-15 NOTE — PROGRESS NOTES
Pt alert and oriented x4. Complaints of abdominal and shoulder pain at this time. Call light in reach and personal items at bedside. Hearing aids and glasses with pt. Oriented to room and plan of care. Vital signs stable.

## 2023-03-15 NOTE — ASSESSMENT & PLAN NOTE
Presents with abdominal pain and hematemesis x1 day  In setting of supratherapeutic INR, on warfarin for hx multiple DVT's  Took a couple of ibuprofen yesterday as well, but denies regular use  Hgb 12.1 on presentation, vital signs stable  INR 5.4 reversed with kcentra and vitamin K, monitor  Continue IV PPI BID  Status post EGD.  Patient noted to have bleeding along the gastric bypass line.  Start diet  Trend hemoglobin

## 2023-03-15 NOTE — ASSESSMENT & PLAN NOTE
History of,  With associated neuropathic leg pain, continue home gabapentin  Follow up with vascular surgery as outpatient

## 2023-03-15 NOTE — ED NOTES
Patient medicated per MAR. Pt resting comfortably with even chest rise and fall, reports no needs at this time, call light available and in reach.

## 2023-03-15 NOTE — CONSULTS
Date of Consultation:  3/15/2023    Patient: : Ana Maria Raza  MRN: [id number]    Referring Physician:  Dr. Lucas Archer     GI:Jt Hartman D.O.     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  Patient states the hematemesis started this morning, about 1 quart of blood in total, and associated with left lower quadrant stabbing pain that radiates to the back.  Patient states she is on chronic meloxicam due to her arthritis, and occasionally takes Advil for as needed pain, and took 2 of the Advil yesterday.  Patient is on Coumadin due to recurrent DVTs based off of note in 2010, when patient developed a clot in 10 days.  Patient denies any hematochezia or bright red blood per rectum, heartburn, or nausea or vomiting.    Tobacco use: Denies  Alcohol use: Denies  Illicit drug use: Denies    Last EGD: Last EGD over 30 years ago with gastric bypass  Last colonoscopy: 15 years ago, states unremarkable  NSAID/ASA use: Daily meloxicam use, occasional Advil  Anticoagulation use: Coumadin      Past Medical History:   Diagnosis Date    DVT (deep venous thrombosis) (HCC) 1986    right leg    Arthritis     LEFT FOOT 2ND TOE    Chronic neck pain     Pain     right leg and neck, right shoulder, groin    Unspecified hemorrhagic conditions     on coumadin    Unspecified urinary incontinence          Past Surgical History:   Procedure Laterality Date    HAMMERTOE CORRECTION  3/5/2014    Performed by Philip Bryant D.P.M. at SURGERY [facility]    SHOULDER ARTHROSCOPY W/ ROTATOR CUFF REPAIR  2/23/2012    Performed by DIMAS BARRETO at [facility] ORS    SHOULDER DECOMPRESSION ARTHROSCOPIC  2/23/2012    Performed by DIMAS BARRETO at [facility] ORS    CLAVICLE DISTAL EXCISION  2/23/2012    Performed by DIMAS BARRETO at SURGERY  [facility] ORS    SHOULDER ARTHROSCOPY W/ BICIPITAL TENODESIS REPAIR  2012    Performed by DIMAS BARRETO at SURGERY [facility] ORS    TOE FUSION  2010    left foot; Performed by DINO MELO at SURGERY [facility] ORS    LAMINOTOMY  2006    Anterior cervical fusion x 2 disc's    GASTRIC BYPASS LAPAROSCOPIC  1995    OTHER      right leg surgery (for fistula's)    CHOLECYSTECTOMY  1979       Family History   Problem Relation Age of Onset    Cancer Father         brain tumor    Heart Disease Unknown     Hypertension Unknown     Stroke Unknown        Social History     Socioeconomic History    Marital status:    Tobacco Use    Smoking status: Former     Packs/day: 1.00     Years: 10.00     Pack years: 10.00     Types: Cigarettes     Quit date: 1990     Years since quittin.2    Smokeless tobacco: Never   Substance and Sexual Activity    Alcohol use: Yes     Alcohol/week: 10.5 oz     Types: 21 Standard drinks or equivalent per week     Comment: Pt reporting rarely, unspecified amount    Drug use: No   Other Topics Concern     Service No    Blood Transfusions Yes    Caffeine Concern No    Occupational Exposure No    Hobby Hazards No    Sleep Concern No    Stress Concern No    Weight Concern No    Special Diet No    Back Care No    Exercise No    Bike Helmet No    Seat Belt Yes    Self-Exams Yes       Review of systems:  Review of Systems   Constitutional:  Negative for chills and fever.   HENT:  Negative for congestion.    Eyes:  Negative for double vision.   Respiratory:  Negative for cough, shortness of breath and wheezing.    Cardiovascular:  Negative for chest pain and palpitations.   Gastrointestinal:  Positive for abdominal pain. Negative for blood in stool, constipation, diarrhea, heartburn, melena, nausea and vomiting.   Genitourinary:  Negative for dysuria and urgency.   Musculoskeletal:  Negative for myalgias.   Skin:  Negative for rash.   Neurological:   Negative for dizziness.   Psychiatric/Behavioral:  Negative for substance abuse.        Physical Exam:  Vitals:    03/15/23 1145 03/15/23 1202 03/15/23 1232 03/15/23 1400   BP:  113/55 101/53 110/54   Pulse:  65 70 73   Resp:  16 17 (!) 21   Temp: 37.7 °C (99.8 °F)      TempSrc: Temporal      SpO2:  97% 98% 97%   Weight:       Height:           Physical Exam  Vitals and nursing note reviewed.   Constitutional:       General: She is not in acute distress.  HENT:      Head: Normocephalic and atraumatic.      Nose: Nose normal.      Mouth/Throat:      Mouth: Mucous membranes are moist.   Eyes:      General: No scleral icterus.     Extraocular Movements: Extraocular movements intact.   Cardiovascular:      Rate and Rhythm: Normal rate and regular rhythm.      Heart sounds: No murmur heard.    No friction rub. No gallop.   Pulmonary:      Breath sounds: No wheezing, rhonchi or rales.   Abdominal:      General: Abdomen is flat. Bowel sounds are normal. There is no distension.      Palpations: Abdomen is soft.      Tenderness: There is abdominal tenderness (slight in LLQ). There is no guarding.   Musculoskeletal:         General: No swelling or tenderness.      Cervical back: Neck supple.   Skin:     General: Skin is warm.   Neurological:      General: No focal deficit present.      Mental Status: She is alert.   Psychiatric:         Mood and Affect: Mood normal.         Labs:  Recent Labs     03/15/23  1141   WBC 8.1   RBC 3.89*   HEMOGLOBIN 12.1   HEMATOCRIT 37.1   MCV 95.4   MCH 31.1   MCHC 32.6*   RDW 53.7*   PLATELETCT 273   MPV 9.7     Recent Labs     03/15/23  1141   SODIUM 142   POTASSIUM 4.4   CHLORIDE 110   CO2 22   GLUCOSE 120*   BUN 41*     Recent Labs     03/15/23  1141   APTT 36.6*   INR 5.40*       Recent Labs     03/15/23  1141   ASTSGOT 18   ALTSGPT 14   TBILIRUBIN 0.5   ALKPHOSPHAT 87   GLOBULIN 2.4   INR 5.40*         Imaging:  CTA ABDOMEN PELVIS W & W/O POST PROCESS  Narrative: 3/15/2023 12:39  PM    HISTORY/REASON FOR EXAM:  upper gi bleed    TECHNIQUE/EXAM DESCRIPTION:  CT angiogram of the abdomen and pelvis without and with contrast, with reconstructions.    Initial precontrast helical sections were obtained from the diaphragmatic domes to the lesser trochanters. Following this, 100 mL of Omnipaque 350 nonionic contrast was administered at 5.0 mL/sec and helical scanning was obtained from the diaphragmatic   domes through the lesser trochanters. Thin section overlapping reconstruction interval was utilized and multiplanar volume reformatted were generated in the sagittal and coronal planes.    3D angiographic images were reviewed on PACS. Maximum intensity projection (MIP) images were generated and reviewed.    Low dose optimization technique was utilized for this CT exam including automated exposure control and adjustment of the mA and/or kV according to patient size.    COMPARISON:  1/3/2018.    FINDINGS:    Noncontrast images:  There is no intramural hematoma.    There are atherosclerotic calcifications of the aorta and its branch vessels.    Contrast images:    Aorta and vasculature: No evidence of aortic aneurysm or dissection. No evidence of occlusive disease.    There is no evidence of active gastrointestinal hemorrhage. There are postsurgical changes of the stomach consistent with gastric bypass.    Liver is unremarkable.  Spleen is normal in size.    Pancreas is unremarkable.  The gallbladder is not visualized and may be surgically absent. The common bile duct is mildly dilated at 8 mm.    Adrenal glands are normal.  There is a left parapelvic renal cyst. There is no evidence of hydronephrosis or renal mass.    Bowel is unremarkable.  There is no adenopathy or free fluid.    There is a 3.4 cm right adnexal cyst.    3D angiographic/MIP images of the vasculature confirm the vascular findings as described above.  Impression: 1.  No evidence of active gastrointestinal bleeding.  2.  Status post  gastric bypass.  3.  Atherosclerosis.  4.  Gallbladder is nonvisualized and may be surgically absent.  5.  Mild dilation of the common bile duct 8 mm, stable from the previous exam and likely physiologic status post cholecystectomy.  6.  Left parapelvic renal cysts. No follow up imaging is recommended per consensus guidelines of the 2019 ACR Incidental Findings Committee for probably benign incidental simple appearing renal cystic lesion(s) based on imaging criteria.  7.  3.4 cm right adnexal cyst. Follow-up pelvic ultrasound is recommended for further characterization as neoplasm cannot definitely be excluded.            Impressions:  Hematemesis  Recurrent DVTs on Coumadin  Supratherapeutic INR-5.4 on admission  Mild biliary dilation  3.4 cm right adnexal cyst      MDM:  Patient is a 67-year-old female with past medical history of gastric bypass and recurrent DVTs on Coumadin presenting with hematemesis with chronic NSAID use.    Recommendations:  - N.p.o. at midnight  - Patient was scheduled for EGD tomorrow  - Protonix 40 mg IV twice daily  - Kcentra and vitamin K ordered for supratherapeutic INR  - Recheck INR in a.m.  - Trend hemoglobins      This note was generated using voice recognition software which has a small chance of producing errors of grammar and possibly content. I have made every reasonable attempt to find and correct any obvious errors, but expect that some may not be found prior to finalization of this note.

## 2023-03-15 NOTE — H&P
Hospital Medicine History & Physical Note    Date of Service  3/15/2023    Primary Care Physician  Philip Conley M.D.    Consultants  GI      Code Status  Full Code    Chief Complaint  Chief Complaint   Patient presents with    Abdominal Pain     LLQ    Blood in Vomit     Bright red blood x4       History of Presenting Illness  Ana Maria Raza is a 67 y.o. female who presented 3/15/2023 with abdominal pain, hematemesis. 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 Patient reports onset this morning of crampy abdominal pain on left side radiating to back, moderate severity, no relieving or exacerbating factors, with associated hematemesis.  at bedside reports both bright red and dark blood in emesis, had about 4-5 episodes of bloody vomiting this morning. Patient denies ever having hematemesis before or abnormal bleeding while on warfarin. Her INR has recently been high at 7 and so dosage was being adjusted last few days to counteract this. She states she takes ibuprofen occasionally but not on a regular/daily basis. She took two pills ibuprofen yesterday for her chronic neck and leg pain. No recent illnesses or bouts of vomiting before today.  In the ED, vital signs stable. Hgb 12.1. CBC and CMP otherwise fairly unremarkable. INR 5.4. CTA abdomen shows no evidence of active bleeding, evidence of previous gastric bypass and cholecystectomy, incidental renal cysts and adnexal cyst. Patient given kcentra and vitamin K in ER, GI consulted who plan for EGD tomorrow. Patient is admitted for upper GI bleeding evaluation.      I discussed the plan of care with patient and family.    Review of Systems  Review of Systems   Constitutional:  Negative for chills and fever.   Eyes:  Negative for blurred vision and pain.   Respiratory:  Negative for cough and shortness of breath.    Cardiovascular:  Negative for  chest pain, palpitations and leg swelling.   Gastrointestinal:  Positive for abdominal pain, nausea and vomiting. Negative for blood in stool, constipation, diarrhea and melena.   Genitourinary:  Positive for flank pain. Negative for dysuria and urgency.   Musculoskeletal:  Negative for back pain and falls.   Skin:  Negative for itching and rash.   Neurological:  Negative for dizziness, sensory change, focal weakness, loss of consciousness and headaches.   Psychiatric/Behavioral:  Negative for substance abuse. The patient does not have insomnia.      Past Medical History   has a past medical history of Arthritis, Chronic neck pain, DVT (deep venous thrombosis) (HCC) (1986), Pain, Unspecified hemorrhagic conditions, and Unspecified urinary incontinence.    Surgical History   has a past surgical history that includes laminotomy (2006); cholecystectomy (1979); gastric bypass laparoscopic (1995); other (1986); toe fusion (8/30/2010); shoulder arthroscopy w/ rotator cuff repair (2/23/2012); shoulder decompression arthroscopic (2/23/2012); clavicle distal excision (2/23/2012); shoulder arthroscopy w/ bicipital tenodesis repair (2/23/2012); and hammertoe correction (3/5/2014).     Family History  family history includes Cancer in her father; Heart Disease in her unknown relative; Hypertension in her unknown relative; Stroke in her unknown relative.   Family history reviewed with patient. There is no family history that is pertinent to the chief complaint.     Social History   reports that she quit smoking about 33 years ago. Her smoking use included cigarettes. She has a 10.00 pack-year smoking history. She has never used smokeless tobacco. She reports current alcohol use of about 10.5 oz per week. She reports that she does not use drugs.    Allergies  Allergies   Allergen Reactions    Nkda [No Known Drug Allergy]        Medications  Prior to Admission Medications   Prescriptions Last Dose Informant Patient Reported?  Taking?   Vitamin D3 5000 Unit (125 mcg) Tab 3/15/2023 at 0700  Yes Yes   Sig: Take 5,000 Units by mouth every morning.   gabapentin (NEURONTIN) 600 MG tablet 3/14/2023 at hs  Yes No   Sig: Take 600 mg by mouth at bedtime.   meloxicam (MOBIC) 15 MG tablet 3/15/2023 at 0700  Yes Yes   Sig: Take 15 mg by mouth every morning.   oxyCODONE-acetaminophen (PERCOCET) 5-325 MG Tab 3/15/2023 at 0700  Yes No   Sig: Take 0.5 Tablets by mouth every 8 hours as needed.   warfarin (COUMADIN) 3 MG Tab 3/15/2023 at 0700  Yes No   Sig: Take 3-6 mg by mouth every morning. 6 mg daily Monday-Friday  3 mg on Saturday and Sunday      Facility-Administered Medications: None       Physical Exam  Temp:  [36.8 °C (98.3 °F)-37.7 °C (99.8 °F)] 37.7 °C (99.8 °F)  Pulse:  [63-73] 73  Resp:  [16-21] 21  BP: ()/(53-66) 110/54  SpO2:  [97 %-100 %] 97 %  Blood Pressure : 101/53   Temperature: 37.7 °C (99.8 °F)   Pulse: 70   Respiration: 17   Pulse Oximetry: 98 %       Physical Exam  Vitals reviewed.   Constitutional:       General: She is not in acute distress.     Appearance: She is not diaphoretic.   HENT:      Head: Normocephalic and atraumatic.      Right Ear: External ear normal.      Left Ear: External ear normal.      Nose: Nose normal. No congestion.      Mouth/Throat:      Pharynx: No oropharyngeal exudate or posterior oropharyngeal erythema.   Eyes:      Extraocular Movements: Extraocular movements intact.      Pupils: Pupils are equal, round, and reactive to light.   Cardiovascular:      Rate and Rhythm: Normal rate and regular rhythm.   Pulmonary:      Effort: Pulmonary effort is normal. No respiratory distress.      Breath sounds: Normal breath sounds. No wheezing or rales.   Abdominal:      General: Bowel sounds are normal. There is no distension.      Palpations: Abdomen is soft.      Tenderness: There is no abdominal tenderness. There is no guarding or rebound.   Musculoskeletal:         General: No swelling. Normal range of  motion.      Cervical back: Normal range of motion and neck supple.      Right lower leg: No edema.      Left lower leg: No edema.   Skin:     General: Skin is warm and dry.   Neurological:      General: No focal deficit present.      Mental Status: She is alert and oriented to person, place, and time.      Cranial Nerves: No cranial nerve deficit.      Sensory: No sensory deficit.      Motor: No weakness.   Psychiatric:         Mood and Affect: Mood normal.         Behavior: Behavior normal.       Laboratory:  Recent Labs     03/15/23  1141   WBC 8.1   RBC 3.89*   HEMOGLOBIN 12.1   HEMATOCRIT 37.1   MCV 95.4   MCH 31.1   MCHC 32.6*   RDW 53.7*   PLATELETCT 273   MPV 9.7     Recent Labs     03/15/23  1141   SODIUM 142   POTASSIUM 4.4   CHLORIDE 110   CO2 22   GLUCOSE 120*   BUN 41*   CREATININE 0.63   CALCIUM 8.9     Recent Labs     03/15/23  1141   ALTSGPT 14   ASTSGOT 18   ALKPHOSPHAT 87   TBILIRUBIN 0.5   LIPASE 22   GLUCOSE 120*     Recent Labs     03/15/23  1141   APTT 36.6*   INR 5.40*     No results for input(s): NTPROBNP in the last 72 hours.      No results for input(s): TROPONINT in the last 72 hours.    Imaging:  CTA ABDOMEN PELVIS W & W/O POST PROCESS   Final Result      1.  No evidence of active gastrointestinal bleeding.   2.  Status post gastric bypass.   3.  Atherosclerosis.   4.  Gallbladder is nonvisualized and may be surgically absent.   5.  Mild dilation of the common bile duct 8 mm, stable from the previous exam and likely physiologic status post cholecystectomy.   6.  Left parapelvic renal cysts. No follow up imaging is recommended per consensus guidelines of the 2019 ACR Incidental Findings Committee for probably benign incidental simple appearing renal cystic lesion(s) based on imaging criteria.   7.  3.4 cm right adnexal cyst. Follow-up pelvic ultrasound is recommended for further characterization as neoplasm cannot definitely be excluded.              Assessment/Plan:  Justification for  Admission Status  I anticipate this patient will require at least two midnights for appropriate medical management, necessitating inpatient admission because evaluation and treatment of upper GI bleed including endoscopy is expected to take >2 midnights.    Patient will need a Med/Surg bed on MEDICAL service .  The need is secondary to continued monitoring and treatment of upper GI bleed.    * Upper GI bleed- (present on admission)  Assessment & Plan  Presents with abdominal pain and hematemesis x1 day  In setting of supratherapeutic INR, on warfarin for hx multiple DVT's  Took a couple of ibuprofen yesterday as well, but denies regular use  Hgb 12.1 on presentation, vital signs stable  INR 5.4 reversed with kcentra and vitamin K, monitor  Continue IV PPI BID  Plan for EGD tomorrow, NPO at midnight    Supratherapeutic INR  Assessment & Plan  INR 5.4 on presentation, given kcentra and vitamin K in setting of active upper GI bleeding  Monitor INR  Hold warfarin for now    Peripheral vascular disease (HCC)  Assessment & Plan  History of,  With associated neuropathic leg pain, continue home gabapentin  Follow up with vascular surgery as outpatient    Chronic neck pain- (present on admission)  Assessment & Plan  Continue home pain medication    DVT (deep venous thrombosis) (HCC)- (present on admission)  Assessment & Plan  History of,  On warfarin chronically  Holding warfarin for now in setting of upper GI bleed  Resume when appropriate        VTE prophylaxis: SCDs/TEDs

## 2023-03-16 ENCOUNTER — ANESTHESIA EVENT (OUTPATIENT)
Dept: SURGERY | Facility: MEDICAL CENTER | Age: 68
DRG: 813 | End: 2023-03-16
Payer: COMMERCIAL

## 2023-03-16 ENCOUNTER — ANESTHESIA (OUTPATIENT)
Dept: SURGERY | Facility: MEDICAL CENTER | Age: 68
DRG: 813 | End: 2023-03-16
Payer: COMMERCIAL

## 2023-03-16 LAB
ANION GAP SERPL CALC-SCNC: 7 MMOL/L (ref 7–16)
BUN SERPL-MCNC: 24 MG/DL (ref 8–22)
CALCIUM SERPL-MCNC: 7.9 MG/DL (ref 8.5–10.5)
CHLORIDE SERPL-SCNC: 115 MMOL/L (ref 96–112)
CO2 SERPL-SCNC: 21 MMOL/L (ref 20–33)
CREAT SERPL-MCNC: 0.61 MG/DL (ref 0.5–1.4)
ERYTHROCYTE [DISTWIDTH] IN BLOOD BY AUTOMATED COUNT: 53.1 FL (ref 35.9–50)
GFR SERPLBLD CREATININE-BSD FMLA CKD-EPI: 98 ML/MIN/1.73 M 2
GLUCOSE SERPL-MCNC: 93 MG/DL (ref 65–99)
HCT VFR BLD AUTO: 25.3 % (ref 37–47)
HGB BLD-MCNC: 8.3 G/DL (ref 12–16)
INR PPP: 1.26 (ref 0.87–1.13)
MCH RBC QN AUTO: 30.6 PG (ref 27–33)
MCHC RBC AUTO-ENTMCNC: 32.8 G/DL (ref 33.6–35)
MCV RBC AUTO: 93.4 FL (ref 81.4–97.8)
PLATELET # BLD AUTO: 185 K/UL (ref 164–446)
PMV BLD AUTO: 10.1 FL (ref 9–12.9)
POTASSIUM SERPL-SCNC: 3.9 MMOL/L (ref 3.6–5.5)
PROTHROMBIN TIME: 15.6 SEC (ref 12–14.6)
RBC # BLD AUTO: 2.71 M/UL (ref 4.2–5.4)
SODIUM SERPL-SCNC: 143 MMOL/L (ref 135–145)
WBC # BLD AUTO: 4.3 K/UL (ref 4.8–10.8)

## 2023-03-16 PROCEDURE — 99231 SBSQ HOSP IP/OBS SF/LOW 25: CPT | Performed by: STUDENT IN AN ORGANIZED HEALTH CARE EDUCATION/TRAINING PROGRAM

## 2023-03-16 PROCEDURE — C9113 INJ PANTOPRAZOLE SODIUM, VIA: HCPCS | Performed by: STUDENT IN AN ORGANIZED HEALTH CARE EDUCATION/TRAINING PROGRAM

## 2023-03-16 PROCEDURE — 700105 HCHG RX REV CODE 258: Performed by: STUDENT IN AN ORGANIZED HEALTH CARE EDUCATION/TRAINING PROGRAM

## 2023-03-16 PROCEDURE — 770006 HCHG ROOM/CARE - MED/SURG/GYN SEMI*

## 2023-03-16 PROCEDURE — 85027 COMPLETE CBC AUTOMATED: CPT

## 2023-03-16 PROCEDURE — 700105 HCHG RX REV CODE 258: Performed by: ANESTHESIOLOGY

## 2023-03-16 PROCEDURE — 700102 HCHG RX REV CODE 250 W/ 637 OVERRIDE(OP): Performed by: STUDENT IN AN ORGANIZED HEALTH CARE EDUCATION/TRAINING PROGRAM

## 2023-03-16 PROCEDURE — 0DC68ZZ EXTIRPATION OF MATTER FROM STOMACH, VIA NATURAL OR ARTIFICIAL OPENING ENDOSCOPIC: ICD-10-PCS | Performed by: INTERNAL MEDICINE

## 2023-03-16 PROCEDURE — 160027 HCHG SURGERY MINUTES - 1ST 30 MINS LEVEL 2: Performed by: INTERNAL MEDICINE

## 2023-03-16 PROCEDURE — 160002 HCHG RECOVERY MINUTES (STAT): Performed by: INTERNAL MEDICINE

## 2023-03-16 PROCEDURE — 80048 BASIC METABOLIC PNL TOTAL CA: CPT

## 2023-03-16 PROCEDURE — 160009 HCHG ANES TIME/MIN: Performed by: INTERNAL MEDICINE

## 2023-03-16 PROCEDURE — 43235 EGD DIAGNOSTIC BRUSH WASH: CPT | Performed by: INTERNAL MEDICINE

## 2023-03-16 PROCEDURE — 700111 HCHG RX REV CODE 636 W/ 250 OVERRIDE (IP): Performed by: STUDENT IN AN ORGANIZED HEALTH CARE EDUCATION/TRAINING PROGRAM

## 2023-03-16 PROCEDURE — 00731 ANES UPR GI NDSC PX NOS: CPT | Performed by: ANESTHESIOLOGY

## 2023-03-16 PROCEDURE — 700111 HCHG RX REV CODE 636 W/ 250 OVERRIDE (IP): Performed by: ANESTHESIOLOGY

## 2023-03-16 PROCEDURE — 85610 PROTHROMBIN TIME: CPT

## 2023-03-16 PROCEDURE — 700101 HCHG RX REV CODE 250: Performed by: ANESTHESIOLOGY

## 2023-03-16 PROCEDURE — 160038 HCHG SURGERY MINUTES - EA ADDL 1 MIN LEVEL 2: Performed by: INTERNAL MEDICINE

## 2023-03-16 PROCEDURE — A9270 NON-COVERED ITEM OR SERVICE: HCPCS | Performed by: STUDENT IN AN ORGANIZED HEALTH CARE EDUCATION/TRAINING PROGRAM

## 2023-03-16 PROCEDURE — 160035 HCHG PACU - 1ST 60 MINS PHASE I: Performed by: INTERNAL MEDICINE

## 2023-03-16 PROCEDURE — 36415 COLL VENOUS BLD VENIPUNCTURE: CPT

## 2023-03-16 PROCEDURE — 160048 HCHG OR STATISTICAL LEVEL 1-5: Performed by: INTERNAL MEDICINE

## 2023-03-16 RX ORDER — OXYCODONE HCL 5 MG/5 ML
5 SOLUTION, ORAL ORAL
Status: DISCONTINUED | OUTPATIENT
Start: 2023-03-16 | End: 2023-03-16 | Stop reason: HOSPADM

## 2023-03-16 RX ORDER — MIDAZOLAM HYDROCHLORIDE 1 MG/ML
INJECTION INTRAMUSCULAR; INTRAVENOUS PRN
Status: DISCONTINUED | OUTPATIENT
Start: 2023-03-16 | End: 2023-03-16 | Stop reason: SURG

## 2023-03-16 RX ORDER — EPHEDRINE SULFATE 50 MG/ML
5 INJECTION, SOLUTION INTRAVENOUS
Status: DISCONTINUED | OUTPATIENT
Start: 2023-03-16 | End: 2023-03-16 | Stop reason: HOSPADM

## 2023-03-16 RX ORDER — SODIUM CHLORIDE, SODIUM LACTATE, POTASSIUM CHLORIDE, CALCIUM CHLORIDE 600; 310; 30; 20 MG/100ML; MG/100ML; MG/100ML; MG/100ML
INJECTION, SOLUTION INTRAVENOUS
Status: DISCONTINUED | OUTPATIENT
Start: 2023-03-16 | End: 2023-03-16 | Stop reason: SURG

## 2023-03-16 RX ORDER — OXYCODONE HCL 5 MG/5 ML
10 SOLUTION, ORAL ORAL
Status: DISCONTINUED | OUTPATIENT
Start: 2023-03-16 | End: 2023-03-16 | Stop reason: HOSPADM

## 2023-03-16 RX ORDER — HYDROMORPHONE HYDROCHLORIDE 1 MG/ML
0.4 INJECTION, SOLUTION INTRAMUSCULAR; INTRAVENOUS; SUBCUTANEOUS
Status: DISCONTINUED | OUTPATIENT
Start: 2023-03-16 | End: 2023-03-16 | Stop reason: HOSPADM

## 2023-03-16 RX ORDER — DIPHENHYDRAMINE HYDROCHLORIDE 50 MG/ML
12.5 INJECTION INTRAMUSCULAR; INTRAVENOUS
Status: DISCONTINUED | OUTPATIENT
Start: 2023-03-16 | End: 2023-03-16 | Stop reason: HOSPADM

## 2023-03-16 RX ORDER — ONDANSETRON 2 MG/ML
4 INJECTION INTRAMUSCULAR; INTRAVENOUS
Status: DISCONTINUED | OUTPATIENT
Start: 2023-03-16 | End: 2023-03-16 | Stop reason: HOSPADM

## 2023-03-16 RX ORDER — SODIUM CHLORIDE, SODIUM LACTATE, POTASSIUM CHLORIDE, CALCIUM CHLORIDE 600; 310; 30; 20 MG/100ML; MG/100ML; MG/100ML; MG/100ML
INJECTION, SOLUTION INTRAVENOUS CONTINUOUS
Status: DISCONTINUED | OUTPATIENT
Start: 2023-03-16 | End: 2023-03-16 | Stop reason: HOSPADM

## 2023-03-16 RX ORDER — HYDROMORPHONE HYDROCHLORIDE 1 MG/ML
0.2 INJECTION, SOLUTION INTRAMUSCULAR; INTRAVENOUS; SUBCUTANEOUS
Status: DISCONTINUED | OUTPATIENT
Start: 2023-03-16 | End: 2023-03-16 | Stop reason: HOSPADM

## 2023-03-16 RX ORDER — HALOPERIDOL 5 MG/ML
1 INJECTION INTRAMUSCULAR
Status: DISCONTINUED | OUTPATIENT
Start: 2023-03-16 | End: 2023-03-16 | Stop reason: HOSPADM

## 2023-03-16 RX ORDER — HYDROMORPHONE HYDROCHLORIDE 1 MG/ML
0.1 INJECTION, SOLUTION INTRAMUSCULAR; INTRAVENOUS; SUBCUTANEOUS
Status: DISCONTINUED | OUTPATIENT
Start: 2023-03-16 | End: 2023-03-16 | Stop reason: HOSPADM

## 2023-03-16 RX ORDER — LIDOCAINE HYDROCHLORIDE 20 MG/ML
INJECTION, SOLUTION EPIDURAL; INFILTRATION; INTRACAUDAL; PERINEURAL PRN
Status: DISCONTINUED | OUTPATIENT
Start: 2023-03-16 | End: 2023-03-16 | Stop reason: SURG

## 2023-03-16 RX ADMIN — PANTOPRAZOLE SODIUM 40 MG: 40 INJECTION, POWDER, FOR SOLUTION INTRAVENOUS at 05:35

## 2023-03-16 RX ADMIN — PROPOFOL 20 MG: 10 INJECTION, EMULSION INTRAVENOUS at 08:34

## 2023-03-16 RX ADMIN — LIDOCAINE HYDROCHLORIDE 50 MG: 20 INJECTION, SOLUTION EPIDURAL; INFILTRATION; INTRACAUDAL at 08:28

## 2023-03-16 RX ADMIN — FENTANYL CITRATE 10 MCG: 50 INJECTION, SOLUTION INTRAMUSCULAR; INTRAVENOUS at 08:58

## 2023-03-16 RX ADMIN — FENTANYL CITRATE 25 MCG: 50 INJECTION, SOLUTION INTRAMUSCULAR; INTRAVENOUS at 08:24

## 2023-03-16 RX ADMIN — PROPOFOL 10 MG: 10 INJECTION, EMULSION INTRAVENOUS at 08:53

## 2023-03-16 RX ADMIN — PANTOPRAZOLE SODIUM 40 MG: 40 INJECTION, POWDER, FOR SOLUTION INTRAVENOUS at 18:00

## 2023-03-16 RX ADMIN — PROPOFOL 25 MG: 10 INJECTION, EMULSION INTRAVENOUS at 08:46

## 2023-03-16 RX ADMIN — PROPOFOL 25 MG: 10 INJECTION, EMULSION INTRAVENOUS at 08:58

## 2023-03-16 RX ADMIN — PROPOFOL 20 MG: 10 INJECTION, EMULSION INTRAVENOUS at 08:38

## 2023-03-16 RX ADMIN — FENTANYL CITRATE 15 MCG: 50 INJECTION, SOLUTION INTRAMUSCULAR; INTRAVENOUS at 08:34

## 2023-03-16 RX ADMIN — LIDOCAINE HYDROCHLORIDE 50 MG: 20 INJECTION, SOLUTION EPIDURAL; INFILTRATION; INTRACAUDAL at 08:36

## 2023-03-16 RX ADMIN — SODIUM CHLORIDE: 9 INJECTION, SOLUTION INTRAVENOUS at 03:16

## 2023-03-16 RX ADMIN — GABAPENTIN 600 MG: 300 CAPSULE ORAL at 20:28

## 2023-03-16 RX ADMIN — PROPOFOL 10 MG: 10 INJECTION, EMULSION INTRAVENOUS at 08:30

## 2023-03-16 RX ADMIN — PROPOFOL 15 MG: 10 INJECTION, EMULSION INTRAVENOUS at 08:50

## 2023-03-16 RX ADMIN — SODIUM CHLORIDE, POTASSIUM CHLORIDE, SODIUM LACTATE AND CALCIUM CHLORIDE: 600; 310; 30; 20 INJECTION, SOLUTION INTRAVENOUS at 08:15

## 2023-03-16 RX ADMIN — PROPOFOL 30 MG: 10 INJECTION, EMULSION INTRAVENOUS at 08:28

## 2023-03-16 RX ADMIN — MIDAZOLAM HYDROCHLORIDE 2 MG: 1 INJECTION, SOLUTION INTRAMUSCULAR; INTRAVENOUS at 08:24

## 2023-03-16 RX ADMIN — PROPOFOL 20 MG: 10 INJECTION, EMULSION INTRAVENOUS at 08:42

## 2023-03-16 RX ADMIN — FENTANYL CITRATE 10 MCG: 50 INJECTION, SOLUTION INTRAMUSCULAR; INTRAVENOUS at 08:46

## 2023-03-16 RX ADMIN — OXYCODONE HYDROCHLORIDE 5 MG: 5 TABLET ORAL at 10:43

## 2023-03-16 ASSESSMENT — PAIN DESCRIPTION - PAIN TYPE
TYPE: SURGICAL PAIN

## 2023-03-16 ASSESSMENT — ENCOUNTER SYMPTOMS
SHORTNESS OF BREATH: 0
ABDOMINAL PAIN: 0
CHILLS: 0
FEVER: 0
PALPITATIONS: 0

## 2023-03-16 ASSESSMENT — PATIENT HEALTH QUESTIONNAIRE - PHQ9
1. LITTLE INTEREST OR PLEASURE IN DOING THINGS: NOT AT ALL
SUM OF ALL RESPONSES TO PHQ9 QUESTIONS 1 AND 2: 0

## 2023-03-16 NOTE — OR NURSING
0902 Arrived from OR. ID verified. Report received. Attached to monitors. Patient sleep easy to wake. 6L 02 mask respirations even and unlabored.     0915 Awake. 2L 02 via NC respirations even and non-labored.     0925 Report given to Kamini FERGUSON all questions answered.     0953 Patient back to room with all his personal belongings.     0955 Patient back to room with all personal belongings.

## 2023-03-16 NOTE — PROGRESS NOTES
[facility] Medicine Daily Progress Note    Date of Service  3/16/2023    Chief Complaint  Ana Maria Raza is a 67 y.o. female admitted 3/15/2023 with GI bleed    Hospital Course  Ana Maria Raza is a 67 y.o. female who presented 3/15/2023 with abdominal pain, hematemesis. 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 Patient reports onset this morning of crampy abdominal pain on left side radiating to back, moderate severity, no relieving or exacerbating factors, with associated hematemesis.  at bedside reports both bright red and dark blood in emesis, had about 4-5 episodes of bloody vomiting this morning. Patient denies ever having hematemesis before or abnormal bleeding while on warfarin. Her INR has recently been high at 7 and so dosage was being adjusted last few days to counteract this. She states she takes ibuprofen occasionally but not on a regular/daily basis. She took two pills ibuprofen yesterday for her chronic neck and leg pain. No recent illnesses or bouts of vomiting before today.  In the ED, vital signs stable. Hgb 12.1. CBC and CMP otherwise fairly unremarkable. INR 5.4. CTA abdomen shows no evidence of active bleeding, evidence of previous gastric bypass and cholecystectomy, incidental renal cysts and adnexal cyst. Patient given kcentra and vitamin K in ER, GI consulted who plan for EGD tomorrow. Patient is admitted for upper GI bleeding evaluation.       Interval Problem Update  Status post EGD.  Small amount of bleeding noted along the incision lines in the stomach.  Currently on PPI.  INR improved to 1.26.  Patient had been previously on a NOAC but due to cost was on warfarin.  We will reach out to see if a NOAC is an option for the patient.    I have discussed this patient's plan of care and discharge plan at IDT rounds today with Case Management, Nursing, Nursing  leadership, and other members of the IDT team.    Consultants/Specialty  GI    Code Status  Full Code    Disposition  Patient is not medically cleared for discharge.   Anticipate discharge to to home with close outpatient follow-up.  I have placed the appropriate orders for post-discharge needs.    Review of Systems  Review of Systems   Constitutional:  Negative for chills and fever.   Respiratory:  Negative for shortness of breath.    Cardiovascular:  Negative for chest pain and palpitations.   Gastrointestinal:  Negative for abdominal pain.   All other systems reviewed and are negative.     Physical Exam  Temp:  [36.3 °C (97.3 °F)-37 °C (98.6 °F)] 36.3 °C (97.3 °F)  Pulse:  [63-74] 68  Resp:  [14-21] 16  BP: ()/(50-64) 106/59  SpO2:  [97 %-100 %] 97 %    Physical Exam  Constitutional:       General: She is not in acute distress.  HENT:      Head: Normocephalic and atraumatic.   Eyes:      General: No scleral icterus.        Right eye: No discharge.         Left eye: No discharge.   Cardiovascular:      Rate and Rhythm: Normal rate and regular rhythm.      Heart sounds: Normal heart sounds.   Pulmonary:      Effort: Pulmonary effort is normal. No respiratory distress.      Breath sounds: Normal breath sounds. No rales.   Abdominal:      General: Bowel sounds are normal. There is no distension.      Tenderness: There is no abdominal tenderness.   Musculoskeletal:         General: No tenderness.      Right lower leg: No edema.      Left lower leg: No edema.   Skin:     General: Skin is warm and dry.      Coloration: Skin is not jaundiced.   Neurological:      General: No focal deficit present.      Mental Status: She is alert and oriented to person, place, and time.      Cranial Nerves: No cranial nerve deficit.   Psychiatric:         Mood and Affect: Mood normal.         Judgment: Judgment normal.       Fluids  No intake or output data in the 24 hours ending 03/16/23 1339    Laboratory  Recent Labs      03/15/23  1141 03/16/23  0414   WBC 8.1 4.3*   RBC 3.89* 2.71*   HEMOGLOBIN 12.1 8.3*   HEMATOCRIT 37.1 25.3*   MCV 95.4 93.4   MCH 31.1 30.6   MCHC 32.6* 32.8*   RDW 53.7* 53.1*   PLATELETCT 273 185   MPV 9.7 10.1     Recent Labs     03/15/23  1141 03/16/23  0414   SODIUM 142 143   POTASSIUM 4.4 3.9   CHLORIDE 110 115*   CO2 22 21   GLUCOSE 120* 93   BUN 41* 24*   CREATININE 0.63 0.61   CALCIUM 8.9 7.9*     Recent Labs     03/15/23  1141 03/16/23  0414   APTT 36.6*  --    INR 5.40* 1.26*               Imaging  CTA ABDOMEN PELVIS W & W/O POST PROCESS   Final Result      1.  No evidence of active gastrointestinal bleeding.   2.  Status post gastric bypass.   3.  Atherosclerosis.   4.  Gallbladder is nonvisualized and may be surgically absent.   5.  Mild dilation of the common bile duct 8 mm, stable from the previous exam and likely physiologic status post cholecystectomy.   6.  Left parapelvic renal cysts. No follow up imaging is recommended per consensus guidelines of the 2019 ACR Incidental Findings Committee for probably benign incidental simple appearing renal cystic lesion(s) based on imaging criteria.   7.  3.4 cm right adnexal cyst. Follow-up pelvic ultrasound is recommended for further characterization as neoplasm cannot definitely be excluded.           Assessment/Plan  * Upper GI bleed- (present on admission)  Assessment & Plan  Presents with abdominal pain and hematemesis x1 day  In setting of supratherapeutic INR, on warfarin for hx multiple DVT's  Took a couple of ibuprofen yesterday as well, but denies regular use  Hgb 12.1 on presentation, vital signs stable  INR 5.4 reversed with kcentra and vitamin K, monitor  Continue IV PPI BID  Status post EGD.  Patient noted to have bleeding along the gastric bypass line.  Start diet  Trend hemoglobin    Supratherapeutic INR  Assessment & Plan  INR 5.4 on presentation, given kcentra and vitamin K in setting of active upper GI bleeding  Monitor INR  Hold warfarin  for now  Patient with supratherapeutic INR on admission which is now resolved.  INR 1.26.  Patient previously was on a NOAC however due to cost is not currently on 1.  Awaiting GI clearance for restarting of anticoagulation.  We will reach out to case management and pharmacy to see if a NOAC is affordable with her insurance.    Peripheral vascular disease (HCC)  Assessment & Plan  History of,  With associated neuropathic leg pain, continue home gabapentin  Follow up with vascular surgery as outpatient    Chronic neck pain- (present on admission)  Assessment & Plan  Continue home pain medication    DVT (deep venous thrombosis) (HCC)- (present on admission)  Assessment & Plan  History of,  On warfarin chronically  Holding warfarin for now in setting of upper GI bleed  Resume when appropriate         VTE prophylaxis: SCDs/TEDs    I have performed a physical exam and reviewed and updated ROS and Plan today (3/16/2023). In review of yesterday's note (3/15/2023), there are no changes except as documented above.

## 2023-03-16 NOTE — ANESTHESIA PREPROCEDURE EVALUATION
Case: [id number] Date/Time: 03/16/23 0815    Procedure: GASTROSCOPY (Esophagus)    Anesthesia type: MAC    Pre-op diagnosis: Hematemesis    Location: [facility] ROOM 26 / SURGERY SAME DAY [facility]    Surgeons: Gissell Rodríguez M.D.      hematemasis  Regular exercise  Hx gastric bypass  Relevant Problems   CARDIAC   (positive) Atherosclerosis of native artery of right lower extremity with intermittent claudication (HCC)   (positive) DVT (deep venous thrombosis) (HCC)   (positive) Iliac artery occlusion, right (HCC)       Physical Exam    Airway   Mallampati: II  TM distance: >3 FB  Neck ROM: full       Cardiovascular - normal exam  Rhythm: regular  Rate: normal  (-) murmur     Dental - normal exam           Pulmonary - normal exam  Breath sounds clear to auscultation     Abdominal    Neurological - normal exam                 Anesthesia Plan    ASA 2       Plan - MAC               Induction: intravenous    Postoperative Plan: Postoperative administration of opioids is intended.    Pertinent diagnostic labs and testing reviewed    Informed Consent:    Anesthetic plan and risks discussed with patient.    Use of blood products discussed with: patient whom consented to blood products.

## 2023-03-16 NOTE — PROGRESS NOTES
4 Eyes Skin Assessment Completed by MARTY Wong and MARTY Robbins.    Head WDL  Ears WDL  Nose WDL  Mouth WDL  Neck WDL  Breast/Chest Scar  Shoulder Blades WDL  Spine WDL  (R) Arm/Elbow/Hand Bruising  (L) Arm/Elbow/Hand Bruising  Abdomen Scar  Groin WDL  Scrotum/Coccyx/Buttocks WDL  (R) Leg Bruising  (L) Leg Bruising  (R) Heel/Foot/Toe WDL  (L) Heel/Foot/Toe WDL          Devices In Places Pulse Ox      Interventions In Place N/A    Possible Skin Injury No    Pictures Uploaded Into Epic N/A  Wound Consult Placed N/A  RN Wound Prevention Protocol Ordered No

## 2023-03-16 NOTE — PROGRESS NOTES
Sudarhsan Lynnh hospitalist has been notified via text of critical values for  Hba: drop from 12.1 to 8.6 and her PT values also has dropped from 47.0 to 15.6. Patient BP has been soft, actually receiving NS @ 100 ml/hr, no complaining of any distress.

## 2023-03-16 NOTE — CARE PLAN
Problem: Pain - Standard  Goal: Alleviation of pain or a reduction in pain to the patient’s comfort goal  Outcome: Progressing     Problem: Knowledge Deficit - Standard  Goal: Patient and family/care givers will demonstrate understanding of plan of care, disease process/condition, diagnostic tests and medications  Outcome: Progressing     Problem: Risk for Fluid Volume Deficit Related to Bleeding  Goal: Fluid volume balance will be maintained  Outcome: Progressing     Problem: Risk for Bleeding  Goal: Patient will take measures to prevent bleeding and recognizes signs of bleeding that need to be reported immediately to a health care professional  Outcome: Progressing  Goal: Patient will not experience bleeding as evidenced by normal blood pressure, stable hematocrit and hemoglobin levels and desired ranges for coagulation profiles  Outcome: Progressing   The patient is Stable - Low risk of patient condition declining or worsening    Shift Goals  Clinical Goals: Monitor bleeding; comfort  Patient Goals: Pain control, rest  Family Goals: HERBIE    Progress made toward(s) clinical / shift goals:  Patient alert and oriented x 4. Requested pain medication once. Up to RR x 3. No bleeding noticed.Will monitor.

## 2023-03-16 NOTE — ANESTHESIA POSTPROCEDURE EVALUATION
Patient: Ana Maria Raza    Procedure Summary     Date: 03/16/23 Room / Location: [facility] ROOM 26 / SURGERY SAME DAY [facility]    Anesthesia Start: 0822 Anesthesia Stop: 0905    Procedures:       GASTROSCOPY (Esophagus)      GASTROSCOPY, WITH FOREIGN BODY REMOVAL (Esophagus) Diagnosis: (diffuse mucosa oozing bypass anastamosis)    Surgeons: Gissell Rodríguez M.D. Responsible Provider: Javy Stevenson M.D.    Anesthesia Type: MAC ASA Status: 2          Final Anesthesia Type: MAC  Last vitals  BP   Blood Pressure : 106/59, NIBP: 133/66    Temp   36.3 °C (97.3 °F)    Pulse   68   Resp   16    SpO2   97 %      Anesthesia Post Evaluation    Patient location during evaluation: PACU  Patient participation: complete - patient participated  Level of consciousness: awake and alert    Airway patency: patent  Anesthetic complications: no  Cardiovascular status: hemodynamically stable  Respiratory status: acceptable  Hydration status: euvolemic    PONV: none          No notable events documented.     Nurse Pain Score: 4 (NPRS)

## 2023-03-16 NOTE — PROCEDURES
OPERATIVE REPORT    PATIENT:   Ana Maria Raza   1955       PREOPERATIVE DIAGNOSES/INDICATIONS: Hematemesis     POSTOPERATIVE DIAGNOSIS:   Diffuse oozing from the bypass surgical site. Likely related to high INR.     PROCEDURE:  ESOPHAGOGASTRODUODENOSCOPY    PHYSICIAN:  Gissell Rodríguez MD. MPH.    ANESTHESIA:  Per anesthesiologist.    LOCATION: [facility]    CONSENT:  OBTAINED. The risks, benefits and alternatives of the procedure were discussed in details. The risks include and are not limited to bleeding, infection, perforation, missed lesions, and sedations risks (cardiopulmonary compromise and allergic reaction to medications).    DESCRIPTION: The patient presented to the procedure room.  After routine checkup was performed, patient was brought into the endoscopy suite.  Patient was placed on his left lateral decubitus position. A bite block was placed in patient's mouth. Patient was sedated by anesthesia.  Vital signs were monitored throughout the procedure.  Oxygenation support was provided throughout the procedure. Upper endoscope was inserted into patient's mouth and advanced to the 30cm into R-limb under direct visualization.      Once the site was reached and examined, the upper endoscope was withdrawn.  Retroflexion was made within the stomach.  The stomach was decompressed, scope was withdrawn and the procedure was terminated.    The patient tolerated the procedure well.  There were no immediate complications.    OPERATIVE FINDINGS:    1. Esophagus: Grossly normal.   2. Stomach: small remaining gastric pouch, multiple oozing spots from mucosa. There was a big blood clot at the anastomosis, removed. After removing the blood clot, multiple slow oozing spots found without definite ulcer. Blind end and R-limb seems stable.         IMPRESSION/RECOMMENDATIONS:  Oozing spots from remaining stomach and the anastomosis site. No ulcer, no tumor, or other major pathology. Likely high INR related bleeding.      Recs:  -PPI iv 40mg bid then change to open capsule method oral PPI bid 40mg.   Okay to resume clear liquid.   INR by the primary team.     When oral PPI is prescribed, please open the capsule and take it with apple sauce to facilitate the healing of marginal ulcer.     GI signs off.       Ref:  Opened Proton Pump Inhibitor Capsules Reduce Time to Healing Compared With Intact Capsules for Marginal Ulceration Following Dai-en-Y Gastric Bypass    Clin Gastroenterol Hepatol. 2017 Apr;15(4):494-500.e1. doi: 10.1016/j.cgh.2016.10.015.       This note has been transcribed with digital voice recognition software and although it has been reviewed may contain grammatical or word errors

## 2023-03-16 NOTE — ANESTHESIA TIME REPORT
Anesthesia Start and Stop Event Times     Date Time Event    3/16/2023 0810 Ready for Procedure     0822 Anesthesia Start     0905 Anesthesia Stop        Responsible Staff  03/16/23    Name Role Begin End    Javy Stevenson M.D. Anesth 0822 0905        Overtime Reason:  no overtime (within assigned shift)    Comments:

## 2023-03-17 VITALS
RESPIRATION RATE: 18 BRPM | OXYGEN SATURATION: 97 % | HEIGHT: 65 IN | BODY MASS INDEX: 25.71 KG/M2 | DIASTOLIC BLOOD PRESSURE: 60 MMHG | WEIGHT: 154.32 LBS | SYSTOLIC BLOOD PRESSURE: 92 MMHG | TEMPERATURE: 98.3 F | HEART RATE: 65 BPM

## 2023-03-17 LAB
ANION GAP SERPL CALC-SCNC: 8 MMOL/L (ref 7–16)
BUN SERPL-MCNC: 13 MG/DL (ref 8–22)
CALCIUM SERPL-MCNC: 8.2 MG/DL (ref 8.5–10.5)
CHLORIDE SERPL-SCNC: 114 MMOL/L (ref 96–112)
CO2 SERPL-SCNC: 21 MMOL/L (ref 20–33)
CREAT SERPL-MCNC: 0.56 MG/DL (ref 0.5–1.4)
ERYTHROCYTE [DISTWIDTH] IN BLOOD BY AUTOMATED COUNT: 56 FL (ref 35.9–50)
GFR SERPLBLD CREATININE-BSD FMLA CKD-EPI: 100 ML/MIN/1.73 M 2
GLUCOSE SERPL-MCNC: 98 MG/DL (ref 65–99)
HCT VFR BLD AUTO: 25 % (ref 37–47)
HGB BLD-MCNC: 8.2 G/DL (ref 12–16)
MCH RBC QN AUTO: 31.4 PG (ref 27–33)
MCHC RBC AUTO-ENTMCNC: 32.8 G/DL (ref 33.6–35)
MCV RBC AUTO: 95.8 FL (ref 81.4–97.8)
PLATELET # BLD AUTO: 167 K/UL (ref 164–446)
PMV BLD AUTO: 10.3 FL (ref 9–12.9)
POTASSIUM SERPL-SCNC: 3.5 MMOL/L (ref 3.6–5.5)
RBC # BLD AUTO: 2.61 M/UL (ref 4.2–5.4)
SODIUM SERPL-SCNC: 143 MMOL/L (ref 135–145)
WBC # BLD AUTO: 4.4 K/UL (ref 4.8–10.8)

## 2023-03-17 PROCEDURE — 36415 COLL VENOUS BLD VENIPUNCTURE: CPT

## 2023-03-17 PROCEDURE — C9113 INJ PANTOPRAZOLE SODIUM, VIA: HCPCS | Performed by: STUDENT IN AN ORGANIZED HEALTH CARE EDUCATION/TRAINING PROGRAM

## 2023-03-17 PROCEDURE — 85027 COMPLETE CBC AUTOMATED: CPT

## 2023-03-17 PROCEDURE — 700105 HCHG RX REV CODE 258: Performed by: STUDENT IN AN ORGANIZED HEALTH CARE EDUCATION/TRAINING PROGRAM

## 2023-03-17 PROCEDURE — 80048 BASIC METABOLIC PNL TOTAL CA: CPT

## 2023-03-17 PROCEDURE — 700111 HCHG RX REV CODE 636 W/ 250 OVERRIDE (IP): Performed by: STUDENT IN AN ORGANIZED HEALTH CARE EDUCATION/TRAINING PROGRAM

## 2023-03-17 PROCEDURE — A9270 NON-COVERED ITEM OR SERVICE: HCPCS | Performed by: STUDENT IN AN ORGANIZED HEALTH CARE EDUCATION/TRAINING PROGRAM

## 2023-03-17 PROCEDURE — 99239 HOSP IP/OBS DSCHRG MGMT >30: CPT | Performed by: STUDENT IN AN ORGANIZED HEALTH CARE EDUCATION/TRAINING PROGRAM

## 2023-03-17 PROCEDURE — 700102 HCHG RX REV CODE 250 W/ 637 OVERRIDE(OP): Performed by: STUDENT IN AN ORGANIZED HEALTH CARE EDUCATION/TRAINING PROGRAM

## 2023-03-17 RX ORDER — POTASSIUM CHLORIDE 20 MEQ/1
40 TABLET, EXTENDED RELEASE ORAL ONCE
Status: COMPLETED | OUTPATIENT
Start: 2023-03-17 | End: 2023-03-17

## 2023-03-17 RX ORDER — OMEPRAZOLE 40 MG/1
40 CAPSULE, DELAYED RELEASE ORAL 2 TIMES DAILY
Qty: 60 CAPSULE | Refills: 0 | Status: SHIPPED | OUTPATIENT
Start: 2023-03-17 | End: 2023-04-16

## 2023-03-17 RX ADMIN — POTASSIUM CHLORIDE 40 MEQ: 1500 TABLET, EXTENDED RELEASE ORAL at 09:00

## 2023-03-17 RX ADMIN — SODIUM CHLORIDE: 9 INJECTION, SOLUTION INTRAVENOUS at 03:31

## 2023-03-17 RX ADMIN — PANTOPRAZOLE SODIUM 40 MG: 40 INJECTION, POWDER, FOR SOLUTION INTRAVENOUS at 04:50

## 2023-03-17 ASSESSMENT — PAIN DESCRIPTION - PAIN TYPE: TYPE: ACUTE PAIN

## 2023-03-17 NOTE — PROGRESS NOTES
Patient ready for discharge, IV removed. Patient has no further needs/concerns/questions at present time. Patient has gathered her personal belongings and is getting dressed.

## 2023-03-17 NOTE — DISCHARGE SUMMARY
Discharge Summary    CHIEF COMPLAINT ON ADMISSION  Chief Complaint   Patient presents with    Abdominal Pain     LLQ    Blood in Vomit     Bright red blood x4       Reason for Admission  EMS     Admission Date  3/15/2023    CODE STATUS  Full Code    HPI & HOSPITAL COURSE  Ana Maria Raza is a 67 y.o. female who presented 3/15/2023 with abdominal pain, hematemesis. 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 Patient reports onset this morning of crampy abdominal pain on left side radiating to back, moderate severity, no relieving or exacerbating factors, with associated hematemesis.  at bedside reports both bright red and dark blood in emesis, had about 4-5 episodes of bloody vomiting this morning. Patient denies ever having hematemesis before or abnormal bleeding while on warfarin. Her INR has recently been high at 7 and so dosage was being adjusted last few days to counteract this. She states she takes ibuprofen occasionally but not on a regular/daily basis. She took two pills ibuprofen yesterday for her chronic neck and leg pain. No recent illnesses or bouts of vomiting before today.  In the ED, vital signs stable. Hgb 12.1. CBC and CMP otherwise fairly unremarkable. INR 5.4. CTA abdomen shows no evidence of active bleeding, evidence of previous gastric bypass and cholecystectomy, incidental renal cysts and adnexal cyst. Patient given kcentra and vitamin K in ER, GI consulted who plan for EGD tomorrow. Patient is admitted for upper GI bleeding evaluation.    EGD performed on 3/16.  Patient was noted to have bleeding along the surgical incision lines in the stomach.  No gastritis noted.  Patient will discharge on omeprazole twice daily.  GI is cleared the patient to resume anticoagulation.  With the recent bleed will not bridged with Lovenox, but allow for gradual increase in the INR with  warfarin    Therefore, she is discharged in good and stable condition to home with close outpatient follow-up.    The patient met 2-midnight criteria for an inpatient stay at the time of discharge.    Discharge Date  3/17/2023    FOLLOW UP ITEMS POST DISCHARGE  Upper GI [id number] with general surgery and GI    DISCHARGE DIAGNOSES  Principal Problem:    Upper GI bleed POA: Yes  Active Problems:    DVT (deep venous thrombosis) (HCC) POA: Yes    Chronic neck pain POA: Yes    Peripheral vascular disease (HCC) POA: Unknown    Supratherapeutic INR POA: Unknown  Resolved Problems:    * No resolved hospital problems. *      FOLLOW UP  No future appointments.  Philip Conley M.D.  [address]  [address]  [place] [address]  [phone]    Schedule an appointment as soon as possible for a visit        MEDICATIONS ON DISCHARGE     Medication List        START taking these medications        Instructions   omeprazole 40 MG delayed-release capsule  Commonly known as: PRILOSEC   Take 1 Capsule by mouth 2 times a day for 30 days.  Dose: 40 mg            CONTINUE taking these medications        Instructions   gabapentin 600 MG tablet  Commonly known as: NEURONTIN   Take 600 mg by mouth at bedtime.  Dose: 600 mg     meloxicam 15 MG tablet  Commonly known as: MOBIC   Take 15 mg by mouth every morning.  Dose: 15 mg     oxyCODONE-acetaminophen 5-325 MG Tabs  Commonly known as: PERCOCET   Take 0.5 Tablets by mouth every 8 hours as needed.  Dose: 0.5 Tablet     vitamin D3 5000 Unit (125 mcg) Tabs  Commonly known as: cholecalciferol   Take 5,000 Units by mouth every morning.  Dose: 5,000 Units     warfarin 3 MG Tabs  Commonly known as: COUMADIN   Take 3-6 mg by mouth every morning. 6 mg daily Monday-Friday  3 mg on Saturday and Sunday  Dose: 3-6 mg              Allergies  Allergies   Allergen Reactions    Nkda [No Known Drug Allergy]        DIET  Orders Placed This Encounter   Procedures    Diet Order Diet: Regular     Standing  Status:   Standing     Number of Occurrences:   1     Order Specific Question:   Diet:     Answer:   Regular [1]       ACTIVITY  As tolerated.  Weight bearing as tolerated    CONSULTATIONS  GI-Dr Rodríguez    PROCEDURES  3/16: EGD    LABORATORY  Lab Results   Component Value Date    SODIUM 143 03/17/2023    POTASSIUM 3.5 (L) 03/17/2023    CHLORIDE 114 (H) 03/17/2023    CO2 21 03/17/2023    GLUCOSE 98 03/17/2023    BUN 13 03/17/2023    CREATININE 0.56 03/17/2023        Lab Results   Component Value Date    WBC 4.4 (L) 03/17/2023    HEMOGLOBIN 8.2 (L) 03/17/2023    HEMATOCRIT 25.0 (L) 03/17/2023    PLATELETCT 167 03/17/2023        Total time of the discharge process exceeds 36 minutes.

## 2023-03-17 NOTE — PROGRESS NOTES
Patient given discharge instructions and educations has already signed documents as well. Patient IV removed and patient has no further questions nor concerns.

## 2023-03-17 NOTE — DISCHARGE PLANNING
Case Management Discharge Planning    Admission Date: 3/15/2023  GMLOS: 3.6  ALOS: 2    6-Clicks ADL Score: 24  6-Clicks Mobility Score: 24      Anticipated Discharge Dispo:      DME Needed: No    Action(s) Taken: OTHER    Patient discussed in IDT rounds. Patient willl discharge today pending clearance from GI.     No CM needs identified at this time.     Escalations Completed: None    Medically Clear: No    Next Steps: Awaiting GI clearance, CM to continue to follow and assist with d/c planning as needed.     Barriers to Discharge: Medical clearance    Is the patient up for discharge tomorrow: No, likely today.

## 2023-03-17 NOTE — DOCUMENTATION QUERY
"                                                                         [facility]                                                                       Query Response Note      PATIENT:               KESHA DENT  ACCT #:                  [id number]  MRN:                     [id number]  :                      1955  ADMIT DATE:       3/15/2023 11:32 AM  DISCH DATE:          RESPONDING  PROVIDER #:        [id number]           QUERY TEXT:    Patient presented with UGIB with elevated PT/INR in the setting of Coumadin use. Can a clarification be provided?        The patient's Clinical Indicators include:  \"Oozing spots from remaining stomach and the anastomosis site. No ulcer, no tumor, or other major pathology. Likely high INR related bleeding\" is documented in the EGD Procedure Note on 3/16. PT: 47, INR: 5.40, and Plt: 273 on admission. H/H: 12.1/37.1 on admission with a decrease to 8.2/25.0 on 3/17. \"Recurrent DVTs, on Coumadin\" is documented in the GI consult on 3/15.    Treatments include: Vitamin K, Kcentra, Protonix gtt, LR, GI Consult, and EGD.     Risk factors include: hx LE DVT on Coumadin Therapy, Long Term Current use of NSAID and Advil, and hx Dai-en-Y Gastric Bypass.    Thank you,  Nathen Dowling RN, BSN  Clinical   Connect via [employer]  Options provided:   -- Hemorrhagic disorder d/t Coumadin therapy is ruled in and was POA   -- Other explanation, Please specify   -- Unable to determine      Query created by: Nathen Dowling on 3/17/2023 7:48 AM    RESPONSE TEXT:    Hemorrhagic disorder d/t Coumadin therapy is ruled in and was POA          Electronically signed by:  PANCHO SMITH MD 3/17/2023 10:40 AM              "

## 2023-03-17 NOTE — CARE PLAN
Problem: Pain - Standard  Goal: Alleviation of pain or a reduction in pain to the patient’s comfort goal  Outcome: Progressing     Problem: Knowledge Deficit - Standard  Goal: Patient and family/care givers will demonstrate understanding of plan of care, disease process/condition, diagnostic tests and medications  Outcome: Progressing     Problem: Risk for Fluid Volume Deficit Related to Bleeding  Goal: Fluid volume balance will be maintained  Outcome: Progressing  Goal: Patient will show no signs and symptoms of excessive bleeding  Outcome: Progressing     Problem: Risk for Bleeding  Goal: Patient will take measures to prevent bleeding and recognizes signs of bleeding that need to be reported immediately to a health care professional  Outcome: Progressing  Goal: Patient will not experience bleeding as evidenced by normal blood pressure, stable hematocrit and hemoglobin levels and desired ranges for coagulation profiles  Outcome: Progressing   The patient is Stable - Low risk of patient condition declining or worsening    Shift Goals  Clinical Goals: Monitor bleeding  Patient Goals: pain  Family Goals: HERBIE    Progress made toward(s) clinical / shift goals:  Patient is alert and oriented x 4. She stated feeling much better tonight. Declined having any kind of pain. Receiving normal saline at 100 ml/hr

## 2023-03-17 NOTE — CARE PLAN
Patient is postop day 1 following adenotonsillectomy.  In the immediate postop period, he had significant bleeding from his adenoid.  We took him back to the OR to control his postop nasopharyngeal hemorrhage.  I did order a coagulopathy work-up given the rare nature of adenoid bleeding, some lab work is still pending.  His hemoglobin has stabilized, some hemodilution from yesterday but within normal limits.  He has had appropriate oral intake.    On exam, the patient is alert and oriented.  No nasal bleeding noted.  No blood in the oropharynx.    Patient is doing well status post adenotonsillectomy and control of postop nasopharyngeal hemorrhage.  He can discharge today.  He will follow-up in clinic as scheduled.    Seth Neri MD  Department of Otolaryngology-Head and Neck Surgery  [facility]    The patient is Stable - Low risk of patient condition declining or worsening    Shift Goals  Clinical Goals: Monitor for GI bleeding  Patient Goals: pain control, rest  Family Goals: HERBIE    Progress made toward(s) clinical / shift goals:  Patient did not have any bleeding to document.    Patient is not progressing towards the following goals:      Problem: Risk for Bleeding  Goal: Patient will take measures to prevent bleeding and recognizes signs of bleeding that need to be reported immediately to a health care professional  Outcome: Met  Goal: Patient will not experience bleeding as evidenced by normal blood pressure, stable hematocrit and hemoglobin levels and desired ranges for coagulation profiles  Outcome: Met      100.5

## 2023-03-20 ENCOUNTER — TELEPHONE (OUTPATIENT)
Dept: VASCULAR LAB | Facility: MEDICAL CENTER | Age: 68
End: 2023-03-20
Payer: COMMERCIAL

## 2023-03-20 NOTE — TELEPHONE ENCOUNTER
[facility] Pharmacotherapy [facility]    Received anticoagulation referral from Dr Hines on 3/17    LM w/ pt's  asking it pt is already managed by someone else as it doesn't appear that warfarin was a new start per d/c summary.    Insurance: [facility]/Medicare  PCP: non [facility]  Locations to be seen: [address]    [facility] at [phone], fax [phone]    Valerie Wood, PharmD

## 2023-03-20 NOTE — TELEPHONE ENCOUNTER
Pt returned my call  She states her PCP was managing her warfarin but developed a GIB. She would like us to manage her warfarin.    Pt also has a HM but doesn't know how to use it. She will bring it to the appt    Scheduled NP for 3/21    Valerie Wood, PharmD

## 2023-03-21 ENCOUNTER — ANTICOAGULATION VISIT (OUTPATIENT)
Dept: VASCULAR LAB | Facility: MEDICAL CENTER | Age: 68
End: 2023-03-21
Attending: INTERNAL MEDICINE
Payer: COMMERCIAL

## 2023-03-21 DIAGNOSIS — I82.509 CHRONIC DEEP VEIN THROMBOSIS (DVT) OF LOWER EXTREMITY, UNSPECIFIED LATERALITY, UNSPECIFIED VEIN (HCC): ICD-10-CM

## 2023-03-21 DIAGNOSIS — Z79.01 CHRONIC ANTICOAGULATION: ICD-10-CM

## 2023-03-21 LAB — INR PPP: 1.2 (ref 2–3.5)

## 2023-03-21 PROCEDURE — 85610 PROTHROMBIN TIME: CPT

## 2023-03-21 PROCEDURE — 99213 OFFICE O/P EST LOW 20 MIN: CPT

## 2023-03-21 NOTE — PROGRESS NOTES
.  Anticoagulation Summary  As of 3/21/2023      INR goal:  2.0-3.0   TTR:  --   INR used for dosin.20 (3/21/2023)   Warfarin maintenance plan:  6 mg (3 mg x 2) every Mon, Fri; 3 mg (3 mg x 1) all other days   Weekly warfarin total:  27 mg   Plan last modified:  Nathen Perez, PharmD (3/21/2023)   Next INR check:  3/24/2023   Target end date:  Indefinite    Indications    DVT (deep venous thrombosis) ([facility]) [I82.409]  Chronic anticoagulation [Z79.01]                 Anticoagulation Episode Summary       INR check location:      Preferred lab:      Send INR reminders to:      Comments:            Anticoagulation Care Providers       Provider Role Specialty Phone number    [facility] Referring  [phone]          Anticoagulation Patient Findings      PCP Dr Philip Conley  Cardiologist none    Pt hx: Multiple episodes of VTE, starting back in .  Family hx significant for VTE in sibling as well.   CHADSVASC = n/a  HAS-BLED = n/a  DOAC = Discussed today but pt declines.  Offered to send Eliquis to pharmacy but pt continues to declines.  Pt with hx of gastric bypass which also wouldn't make her the ideal candidate for DOAC.     Pt was seen at [facility] on 3/15/23.  -Hematemesis and INR >5f  -Hemoglobin was low as a result  -Discharged home on warfarin and referral sent to our clinic  -She has a home monitor for INR but unable to use it    Pt is NOT new to warfarin, but is new to [facility]. Discussed:   Indication for warfarin therapy and INR goal range.   Importance of monitoring and compliance.   Monitoring parameters, signs and symptoms of bleeding or clotting.  Warfarin therapy, side effects, potential DDIs, OTC medications  Hormonal therapy none  DDI Meloxicam- discussed incrased risk of bleeding and I suggest APAP instead.  Pt states she understands but will continue with meloxicam as prescribed by her provider.     Importance of diet consistency, ie vitamin K intake, supplements  Lifestyle  safety, ie smoking, ETOH, hobby safety, fall safety/prevention  Procedures for missed doses or suspected missed doses, surgeries/procedures, travel, dental work, any medication changes    Pt denies any unusual s/s of bleeding, bruising, clotting or any changes to diet or medications.    INR is SUB therapeutic today.   Based on recent bleeding, pt will start on a conservative dose of warfarin and retest in 3 days.     Nathen Perez, PharmD    CC   Dr Bloch Dr Johnson f [phone]      [facility] Pharmacotherapy Program Consent                                             Name    Ana Maria Raza    MRN number: [id number]    the following are guidelines for participation in the [facility] Pharmacotherapy Program.     I, ____Ana Maria Raza_____, understand and voluntarily agree to participate in the [facility] Pharmacotherapy Program and to have services provided to me by pharmacists working in collaboration with my provider.    I understand the pharmacist within the [facility] Pharmacotherapy Program may initiate, modify or discontinue my medications, order appropriate testing and appointments, perform exams, monitor treatment, and make clinical evaluations and decisions pursuant to a collaborative practice agreement with my provider.  I understand the pharmacist within the [facility] Pharmacotherapy Program is not a physician, osteopathic physician, advanced practice registered nurse or physician assistant and may not diagnose.  I will take all my medications as instructed and not change the way I take it without first talking to my provider or a pharmacist within the [facility] Pharmacotherapy Program.  I understand that if I am late to my appointment I may not be able to be seen by a pharmacist at that time and will have to reschedule my appointment.  During appointment with pharmacist I understand that pharmacist has the right not to answer questions or perform services  outside the pharmacist’s scope of practice.  By signing below, I provide informed consent for the pharmacist to provide these services and for my participation in the [facility] Pharmacotherapy Program.      Ana Maria Raza           [id number]          03/21/23  Patient Name                   MRN number  Date     ____Obtained verbal consent from Ana Maria Raza

## 2023-03-22 ENCOUNTER — DOCUMENTATION (OUTPATIENT)
Dept: VASCULAR LAB | Facility: MEDICAL CENTER | Age: 68
End: 2023-03-22
Payer: COMMERCIAL

## 2023-03-22 NOTE — PROGRESS NOTES
Initial anticoag note and most recent d/c summary reviewed.     Pending further recs from pcp or other specialists, we will continue with indefinite oac for recurrent vte.     Michael Bloch, MD  Anticoagulation Clinic    CC:  TEDDY Conley

## 2023-03-24 ENCOUNTER — ANTICOAGULATION VISIT (OUTPATIENT)
Dept: VASCULAR LAB | Facility: MEDICAL CENTER | Age: 68
End: 2023-03-24
Attending: INTERNAL MEDICINE
Payer: COMMERCIAL

## 2023-03-24 DIAGNOSIS — Z79.01 CHRONIC ANTICOAGULATION: ICD-10-CM

## 2023-03-24 LAB — INR PPP: 1.4 (ref 2–3.5)

## 2023-03-24 PROCEDURE — 85610 PROTHROMBIN TIME: CPT

## 2023-03-24 PROCEDURE — 99212 OFFICE O/P EST SF 10 MIN: CPT

## 2023-03-24 NOTE — PROGRESS NOTES
Anticoagulation Summary  As of 3/24/2023      INR goal:  2.0-3.0   TTR:  --   INR used for dosin.40 (3/24/2023)   Warfarin maintenance plan:  6 mg (3 mg x 2) every Mon, Fri; 3 mg (3 mg x 1) all other days   Weekly warfarin total:  27 mg   Plan last modified:  Nathen Perez PharmD (3/21/2023)   Next INR check:  3/28/2023   Target end date:  Indefinite    Indications    DVT (deep venous thrombosis) ([facility]) [I82.409]  Chronic anticoagulation [Z79.01]                 Anticoagulation Episode Summary       INR check location:      Preferred lab:      Send INR reminders to:      Comments:            Anticoagulation Care Providers       Provider Role Specialty Phone number    [facility] Referring  [phone]                  Refer to Patient Findings for HPI:  Patient Findings       Negatives:  Signs/symptoms of thrombosis, Signs/symptoms of bleeding, Laboratory test error suspected, Change in health, Change in alcohol use, Change in activity, Upcoming invasive procedure, Emergency department visit, Upcoming dental procedure, Missed doses, Extra doses, Change in medications, Change in diet/appetite, Hospital admission, Bruising, Other complaints            There were no vitals filed for this visit.  pt declined vitals    Verified current warfarin dosing schedule.    Medications reconciled   Pt is not on antiplatelet therapy      A/P   INR  SUB-therapeutic. Increased from prior.      Warfarin dosing recommendation: Increase warfarin to 6 mg today and 4.5 mg tomorrow and resume previous warfarin regimen.     Pt educated to contact our clinic with any changes in medications or s/s of bleeding or thrombosis. Pt is aware to seek immediate medical attention for falls, head injury or deep cuts.    Follow up appointment in 4 day(s).    Fawn EspositoD

## 2023-03-28 ENCOUNTER — ANTICOAGULATION VISIT (OUTPATIENT)
Dept: VASCULAR LAB | Facility: MEDICAL CENTER | Age: 68
End: 2023-03-28
Attending: INTERNAL MEDICINE
Payer: COMMERCIAL

## 2023-03-28 DIAGNOSIS — Z79.01 CHRONIC ANTICOAGULATION: ICD-10-CM

## 2023-03-28 DIAGNOSIS — I82.509 CHRONIC DEEP VEIN THROMBOSIS (DVT) OF LOWER EXTREMITY, UNSPECIFIED LATERALITY, UNSPECIFIED VEIN (HCC): ICD-10-CM

## 2023-03-28 LAB — INR PPP: 1.9 (ref 2–3.5)

## 2023-03-28 PROCEDURE — 99212 OFFICE O/P EST SF 10 MIN: CPT

## 2023-03-28 PROCEDURE — 85610 PROTHROMBIN TIME: CPT

## 2023-03-28 NOTE — PROGRESS NOTES
Anticoagulation Summary  As of 3/28/2023      INR goal:  2.0-3.0   TTR:  --   INR used for dosin.90 (3/28/2023)   Warfarin maintenance plan:  6 mg (3 mg x 2) every Mon, Fri; 3 mg (3 mg x 1) all other days   Weekly warfarin total:  27 mg   Plan last modified:  Nathen Perez PharmD (3/21/2023)   Next INR check:  3/31/2023   Target end date:  Indefinite    Indications    DVT (deep venous thrombosis) ([facility]) [I82.409]  Chronic anticoagulation [Z79.01]                 Anticoagulation Episode Summary       INR check location:      Preferred lab:      Send INR reminders to:      Comments:            Anticoagulation Care Providers       Provider Role Specialty Phone number    [facility] Referring  [phone]                  Refer to Patient Findings for HPI:  Patient Findings       Negatives:  Signs/symptoms of thrombosis, Signs/symptoms of bleeding, Laboratory test error suspected, Change in health, Change in alcohol use, Change in activity, Upcoming invasive procedure, Emergency department visit, Upcoming dental procedure, Missed doses, Extra doses, Change in medications, Change in diet/appetite, Hospital admission, Bruising, Other complaints            There were no vitals filed for this visit.    Verified current warfarin dosing schedule.    Medications reconciled   Pt is not on antiplatelet therapy      A/P   INR  SUB-therapeutic.     Warfarin dosing recommendation: Instructed pt to BOLUS x 1 dose w/ 4.5 mg today and to then continue on w/ her current regimen.    Pt educated to contact our clinic with any changes in medications or s/s of bleeding or thrombosis. Pt is aware to seek immediate medical attention for falls, head injury or deep cuts.    Follow up appointment in 2 days.    Gaston Kam, FawnD, BCACP

## 2023-03-31 ENCOUNTER — ANTICOAGULATION VISIT (OUTPATIENT)
Dept: VASCULAR LAB | Facility: MEDICAL CENTER | Age: 68
End: 2023-03-31
Attending: INTERNAL MEDICINE
Payer: COMMERCIAL

## 2023-03-31 DIAGNOSIS — Z79.01 CHRONIC ANTICOAGULATION: ICD-10-CM

## 2023-03-31 LAB — INR PPP: 1.7 (ref 2–3.5)

## 2023-03-31 PROCEDURE — 85610 PROTHROMBIN TIME: CPT

## 2023-03-31 PROCEDURE — 99212 OFFICE O/P EST SF 10 MIN: CPT

## 2023-03-31 NOTE — PROGRESS NOTES
Anticoagulation Summary  As of 3/31/2023      INR goal:  2.0-3.0   TTR:  --   INR used for dosin.70 (3/31/2023)   Warfarin maintenance plan:  3 mg (3 mg x 1) every Mon, Wed, Fri; 6 mg (3 mg x 2) all other days   Weekly warfarin total:  33 mg   Plan last modified:  Graham Muniz PharmD (3/31/2023)   Next INR check:  2023   Target end date:  Indefinite    Indications    DVT (deep venous thrombosis) ([facility]) [I82.409]  Chronic anticoagulation [Z79.01]                 Anticoagulation Episode Summary       INR check location:      Preferred lab:      Send INR reminders to:      Comments:            Anticoagulation Care Providers       Provider Role Specialty Phone number    [facility] Referring  [phone]             Refer to Patient Findings for HPI:  Patient Findings       Negatives:  Signs/symptoms of thrombosis, Signs/symptoms of bleeding, Laboratory test error suspected, Change in health, Change in alcohol use, Change in activity, Upcoming invasive procedure, Emergency department visit, Upcoming dental procedure, Missed doses, Extra doses, Change in medications, Change in diet/appetite, Hospital admission, Bruising, Other complaints            There were no vitals filed for this visit.  pt declined vitals    Verified current warfarin dosing schedule.    Medications reconciled  Pt is not on antiplatelet therapy      A/P   INR is sub therapeutic.     Warfarin dosing recommendation: Patient is to bolus with 6 mg tonight, then is to begin increased weekly regimen.     Pt educated to contact our clinic with any changes in medications or s/s of bleeding or thrombosis. Pt is aware to seek immediate medical attention for falls, head injury or deep cuts.    Follow up appointment in 4 day(s).    Graham Muniz PharmD

## 2023-04-04 ENCOUNTER — APPOINTMENT (OUTPATIENT)
Dept: VASCULAR LAB | Facility: MEDICAL CENTER | Age: 68
End: 2023-04-04
Attending: INTERNAL MEDICINE
Payer: COMMERCIAL

## 2023-04-06 ENCOUNTER — ANTICOAGULATION VISIT (OUTPATIENT)
Dept: VASCULAR LAB | Facility: MEDICAL CENTER | Age: 68
End: 2023-04-06
Attending: INTERNAL MEDICINE
Payer: COMMERCIAL

## 2023-04-06 DIAGNOSIS — Z79.01 CHRONIC ANTICOAGULATION: ICD-10-CM

## 2023-04-06 LAB — INR PPP: 2.5 (ref 2–3.5)

## 2023-04-06 PROCEDURE — 99211 OFF/OP EST MAY X REQ PHY/QHP: CPT

## 2023-04-06 PROCEDURE — 85610 PROTHROMBIN TIME: CPT

## 2023-04-06 NOTE — PROGRESS NOTES
Anticoagulation Summary  As of 2023      INR goal:  2.0-3.0   TTR:  64.3 % (6 d)   INR used for dosin.50 (2023)   Warfarin maintenance plan:  3 mg (3 mg x 1) every Mon, Wed, Fri; 6 mg (3 mg x 2) all other days   Weekly warfarin total:  33 mg   Plan last modified:  Graham Muniz PharmD (3/31/2023)   Next INR check:  2023   Target end date:  Indefinite    Indications    DVT (deep venous thrombosis) ([facility]) [I82.409]  Chronic anticoagulation [Z79.01]                 Anticoagulation Episode Summary       INR check location:      Preferred lab:      Send INR reminders to:      Comments:            Anticoagulation Care Providers       Provider Role Specialty Phone number    [facility] Referring  [phone]                  Refer to Patient Findings for HPI:  Patient Findings       Negatives:  Signs/symptoms of thrombosis, Signs/symptoms of bleeding, Laboratory test error suspected, Change in health, Change in alcohol use, Change in activity, Upcoming invasive procedure, Emergency department visit, Upcoming dental procedure, Missed doses, Extra doses, Change in medications, Change in diet/appetite, Hospital admission, Bruising, Other complaints            There were no vitals filed for this visit.   pt declined vitals    Verified current warfarin dosing schedule.    Medications reconciled   Pt is not on antiplatelet therapy      A/P   INR  -therapeutic. Discussed risks of taking Meloxicam while on warfarin. Recommend patient stop taking if possible (takes for arthritis pain).     Warfarin dosing recommendation: Continue current warfarin regimen as above without changes      Pt educated to contact our clinic with any changes in medications or s/s of bleeding or thrombosis. Pt is aware to seek immediate medical attention for falls, head injury or deep cuts.    Follow up appointment in 1 week(s).    Fawn EspositoD

## 2023-04-12 ENCOUNTER — ANTICOAGULATION VISIT (OUTPATIENT)
Dept: VASCULAR LAB | Facility: MEDICAL CENTER | Age: 68
End: 2023-04-12
Attending: INTERNAL MEDICINE
Payer: COMMERCIAL

## 2023-04-12 VITALS — HEART RATE: 50 BPM | SYSTOLIC BLOOD PRESSURE: 117 MMHG | DIASTOLIC BLOOD PRESSURE: 67 MMHG

## 2023-04-12 DIAGNOSIS — Z79.01 CHRONIC ANTICOAGULATION: ICD-10-CM

## 2023-04-12 DIAGNOSIS — I82.509 CHRONIC DEEP VEIN THROMBOSIS (DVT) OF LOWER EXTREMITY, UNSPECIFIED LATERALITY, UNSPECIFIED VEIN (HCC): ICD-10-CM

## 2023-04-12 LAB — INR PPP: 2.8 (ref 2–3.5)

## 2023-04-12 PROCEDURE — 99211 OFF/OP EST MAY X REQ PHY/QHP: CPT | Performed by: NURSE PRACTITIONER

## 2023-04-12 PROCEDURE — 85610 PROTHROMBIN TIME: CPT

## 2023-04-12 NOTE — PROGRESS NOTES
Anticoagulation Summary  As of 2023      INR goal:  2.0-3.0   TTR:  82.2 % (1.7 wk)   INR used for dosin.80 (2023)   Warfarin maintenance plan:  3 mg (3 mg x 1) every Mon, Wed, Fri; 6 mg (3 mg x 2) all other days   Weekly warfarin total:  33 mg   Plan last modified:  Graham Muniz, PharmD (3/31/2023)   Next INR check:  2023   Target end date:  Indefinite    Indications    DVT (deep venous thrombosis) ([facility]) [I82.409]  Chronic anticoagulation [Z79.01]                 Anticoagulation Episode Summary       INR check location:      Preferred lab:      Send INR reminders to:      Comments:            Anticoagulation Care Providers       Provider Role Specialty Phone number    [facility] Referring  [phone]                  Refer to Patient Findings for HPI:  Patient Findings       Negatives:  Signs/symptoms of thrombosis, Signs/symptoms of bleeding, Laboratory test error suspected, Change in health, Change in alcohol use, Change in activity, Upcoming invasive procedure, Emergency department visit, Upcoming dental procedure, Missed doses, Extra doses, Change in medications, Change in diet/appetite, Hospital admission, Bruising, Other complaints    Comments:  Takes 1 meloxicam daily - is aware of the increase bleeding risk and knows to monitor closely.            Vitals:    23 1424   BP: 117/67   Pulse: (!) 50       Verified current warfarin dosing schedule.    Medications reconciled   Pt is not on antiplatelet therapy      A/P   INR  -therapeutic. INR trended up a little from last week. Encouraged pt to eat an additional servings of greens each week. Also recommended she avoid all other NSAIDs which she is agreeable to.    Warfarin dosing recommendation: Take 3 mg --, 6 mg AODs.    Pt educated to contact our clinic with any changes in medications or s/s of bleeding or thrombosis. Pt is aware to seek immediate medical attention for falls, head injury or deep  cuts.    Follow up appointment in 1 week(s).    SALINA Vallejo.

## 2023-04-18 ENCOUNTER — ANTICOAGULATION VISIT (OUTPATIENT)
Dept: VASCULAR LAB | Facility: MEDICAL CENTER | Age: 68
End: 2023-04-18
Attending: INTERNAL MEDICINE
Payer: COMMERCIAL

## 2023-04-18 DIAGNOSIS — I82.509 CHRONIC DEEP VEIN THROMBOSIS (DVT) OF LOWER EXTREMITY, UNSPECIFIED LATERALITY, UNSPECIFIED VEIN (HCC): ICD-10-CM

## 2023-04-18 DIAGNOSIS — Z79.01 CHRONIC ANTICOAGULATION: ICD-10-CM

## 2023-04-18 LAB — INR PPP: 1.9 (ref 2–3.5)

## 2023-04-18 PROCEDURE — 99211 OFF/OP EST MAY X REQ PHY/QHP: CPT

## 2023-04-18 PROCEDURE — 85610 PROTHROMBIN TIME: CPT

## 2023-04-18 NOTE — PROGRESS NOTES
Anticoagulation Summary  As of 2023      INR goal:  2.0-3.0   TTR:  83.0 % (2.6 wk)   INR used for dosin.90 (2023)   Warfarin maintenance plan:  3 mg (3 mg x 1) every Mon, Wed, Fri; 6 mg (3 mg x 2) all other days   Weekly warfarin total:  33 mg   Plan last modified:  Graham Muniz, PharmD (3/31/2023)   Next INR check:  2023   Target end date:  Indefinite    Indications    DVT (deep venous thrombosis) ([facility]) [I82.409]  Chronic anticoagulation [Z79.01]                 Anticoagulation Episode Summary       INR check location:      Preferred lab:      Send INR reminders to:      Comments:            Anticoagulation Care Providers       Provider Role Specialty Phone number    [facility] Referring  [phone]          Refer to Patient Findings for HPI:  Patient Findings       Negatives:  Signs/symptoms of thrombosis, Signs/symptoms of bleeding, Laboratory test error suspected, Change in health, Change in alcohol use, Change in activity, Upcoming invasive procedure, Emergency department visit, Upcoming dental procedure, Missed doses, Extra doses, Change in medications, Change in diet/appetite, Hospital admission, Bruising, Other complaints          There were no vitals filed for this visit.  The pt declined vitals today     Patient seen in clinic today for follow up on anticoagulation therapy with warfarin (a high risk medication) for hx of DVT  Verified current warfarin dosing schedule.  Patient denies any missed doses of warfarin.    Medications reconciled   Pt is not on antiplatelet therapy      A/P   INR is slightly SUB-therapeutic today at 1.9.     Warfarin dosing recommendation: Since the patient has been therapeutic on this dose and only 0.1 away from goal, will have her stay on the current dosing regimen and retest again in 1 week.     Pt educated to contact our clinic with any changes in medications or s/s of bleeding or thrombosis. Pt is aware to seek immediate  medical attention for falls, head injury or deep cuts.    Follow up appointment in 1 week(s).    Next appt: Thurs, April 27 @ 2:15pm     Tim Akbar Pharm D Student  Viviane Cunha PharmD

## 2023-04-27 ENCOUNTER — ANTICOAGULATION VISIT (OUTPATIENT)
Dept: VASCULAR LAB | Facility: MEDICAL CENTER | Age: 68
End: 2023-04-27
Attending: INTERNAL MEDICINE
Payer: COMMERCIAL

## 2023-04-27 DIAGNOSIS — I82.509 CHRONIC DEEP VEIN THROMBOSIS (DVT) OF LOWER EXTREMITY, UNSPECIFIED LATERALITY, UNSPECIFIED VEIN (HCC): ICD-10-CM

## 2023-04-27 DIAGNOSIS — Z79.01 CHRONIC ANTICOAGULATION: ICD-10-CM

## 2023-04-27 LAB — INR PPP: 3.2 (ref 2–3.5)

## 2023-04-27 PROCEDURE — 85610 PROTHROMBIN TIME: CPT

## 2023-04-27 PROCEDURE — 99212 OFFICE O/P EST SF 10 MIN: CPT

## 2023-04-27 NOTE — PROGRESS NOTES
Anticoagulation Summary  As of 4/27/2023      INR goal:  2.0-3.0   TTR:  80.1 % (3.9 wk)   INR used for dosing:  3.20 (4/27/2023)   Warfarin maintenance plan:  3 mg (3 mg x 1) every Mon, Wed, Fri; 6 mg (3 mg x 2) all other days   Weekly warfarin total:  33 mg   Plan last modified:  Graham Muniz PharmD (3/31/2023)   Next INR check:  5/4/2023   Target end date:  Indefinite    Indications    DVT (deep venous thrombosis) ([facility]) [I82.409]  Chronic anticoagulation [Z79.01]                 Anticoagulation Episode Summary       INR check location:      Preferred lab:      Send INR reminders to:      Comments:            Anticoagulation Care Providers       Provider Role Specialty Phone number    [facility] Referring  [phone]                  Refer to Patient Findings for HPI:  Patient Findings       Positives:  Change in alcohol use (Drinking wine cooler nightly)    Negatives:  Signs/symptoms of thrombosis, Signs/symptoms of bleeding, Laboratory test error suspected, Change in health, Change in activity, Upcoming invasive procedure, Emergency department visit, Upcoming dental procedure, Missed doses, Extra doses, Change in medications, Change in diet/appetite, Hospital admission, Bruising, Other complaints            There were no vitals filed for this visit.  pt declined vitals    Verified current warfarin dosing schedule.    Medications reconciled   Pt is not on antiplatelet therapy      A/P   INR  SUPRA-therapeutic. Patient will stop drinking wine cooler.     Warfarin dosing recommendation: Reduce warfarin tonight to 4.5 mg and then resume previous warfarin dosing.     Pt educated to contact our clinic with any changes in medications or s/s of bleeding or thrombosis. Pt is aware to seek immediate medical attention for falls, head injury or deep cuts.    Follow up appointment in 1 week(s).    Mackenzie James PharmD

## 2023-05-04 ENCOUNTER — ANTICOAGULATION VISIT (OUTPATIENT)
Dept: VASCULAR LAB | Facility: MEDICAL CENTER | Age: 68
End: 2023-05-04
Attending: STUDENT IN AN ORGANIZED HEALTH CARE EDUCATION/TRAINING PROGRAM
Payer: COMMERCIAL

## 2023-05-04 DIAGNOSIS — I82.509 CHRONIC DEEP VEIN THROMBOSIS (DVT) OF LOWER EXTREMITY, UNSPECIFIED LATERALITY, UNSPECIFIED VEIN (HCC): ICD-10-CM

## 2023-05-04 DIAGNOSIS — Z79.01 CHRONIC ANTICOAGULATION: ICD-10-CM

## 2023-05-04 DIAGNOSIS — K92.2 UPPER GI BLEED: ICD-10-CM

## 2023-05-04 LAB — INR PPP: 2.3 (ref 2–3.5)

## 2023-05-04 PROCEDURE — 99212 OFFICE O/P EST SF 10 MIN: CPT | Performed by: NURSE PRACTITIONER

## 2023-05-04 PROCEDURE — 85610 PROTHROMBIN TIME: CPT

## 2023-05-04 NOTE — PROGRESS NOTES
Anticoagulation Summary  As of 2023      INR goal:  2.0-3.0   TTR:  79.8 % (1.1 mo)   INR used for dosin.30 (2023)   Warfarin maintenance plan:  3 mg (3 mg x 1) every Mon, Wed, Fri; 6 mg (3 mg x 2) all other days   Weekly warfarin total:  33 mg   Plan last modified:  Fawn LovettD (3/31/2023)   Next INR check:  2023   Target end date:  Indefinite    Indications    DVT (deep venous thrombosis) ([facility]) [I82.409]  Chronic anticoagulation [Z79.01]                 Anticoagulation Episode Summary       INR check location:      Preferred lab:      Send INR reminders to:      Comments:            Anticoagulation Care Providers       Provider Role Specialty Phone number    [facility] Referring  [phone]                  Refer to Patient Findings for HPI:  Patient Findings       Negatives:  Signs/symptoms of thrombosis, Signs/symptoms of bleeding, Laboratory test error suspected, Change in health, Change in alcohol use, Change in activity, Upcoming invasive procedure, Emergency department visit, Upcoming dental procedure, Missed doses, Extra doses, Change in medications, Change in diet/appetite, Hospital admission, Bruising, Other complaints            There were no vitals filed for this visit.    Verified current warfarin dosing schedule.    Medications reconciled   Pt is not on antiplatelet therapy      A/P   INR  -therapeutic. INR down from 3.2 last week with a one time dose decrease. Her previous INR was 1.9. With shared decision-making, she will resume taking her usual regimen.    Warfarin dosing recommendation: Take 3 mg M-W-, 6 mg AODs.    Pt educated to contact our clinic with any changes in medications or s/s of bleeding or thrombosis. Pt is aware to seek immediate medical attention for falls, head injury or deep cuts.    Follow up appointment in 2 week(s) per pt's request.    JF VallejoPGingerN.

## 2023-05-18 ENCOUNTER — APPOINTMENT (OUTPATIENT)
Dept: VASCULAR LAB | Facility: MEDICAL CENTER | Age: 68
End: 2023-05-18
Attending: STUDENT IN AN ORGANIZED HEALTH CARE EDUCATION/TRAINING PROGRAM
Payer: COMMERCIAL

## 2023-05-24 ENCOUNTER — APPOINTMENT (OUTPATIENT)
Dept: VASCULAR LAB | Facility: MEDICAL CENTER | Age: 68
End: 2023-05-24
Attending: STUDENT IN AN ORGANIZED HEALTH CARE EDUCATION/TRAINING PROGRAM
Payer: COMMERCIAL

## 2023-05-25 ENCOUNTER — ANTICOAGULATION VISIT (OUTPATIENT)
Dept: VASCULAR LAB | Facility: MEDICAL CENTER | Age: 68
End: 2023-05-25
Attending: STUDENT IN AN ORGANIZED HEALTH CARE EDUCATION/TRAINING PROGRAM
Payer: COMMERCIAL

## 2023-05-25 DIAGNOSIS — K92.2 UPPER GI BLEED: ICD-10-CM

## 2023-05-25 DIAGNOSIS — Z79.01 CHRONIC ANTICOAGULATION: ICD-10-CM

## 2023-05-25 LAB — INR PPP: 2 (ref 2–3.5)

## 2023-05-25 PROCEDURE — 99212 OFFICE O/P EST SF 10 MIN: CPT

## 2023-05-25 PROCEDURE — 85610 PROTHROMBIN TIME: CPT

## 2023-05-25 NOTE — PROGRESS NOTES
Anticoagulation Summary  As of 2023      INR goal:  2.0-3.0   TTR:  87.5 % (1.8 mo)   INR used for dosin.00 (2023)   Warfarin maintenance plan:  3 mg (3 mg x 1) every Mon, Wed, Fri; 6 mg (3 mg x 2) all other days   Weekly warfarin total:  33 mg   Plan last modified:  Fawn LovettD (3/31/2023)   Next INR check:  2023   Target end date:  Indefinite    Indications    DVT (deep venous thrombosis) (HCC) [I82.409]  Chronic anticoagulation [Z79.01]  Upper GI bleed [K92.2]                 Anticoagulation Episode Summary       INR check location:      Preferred lab:      Send INR reminders to:      Comments:  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 DOAC too expensive. Has had gastric bypass.          Anticoagulation Care Providers       Provider Role Specialty Phone number    [facility] Referring  [phone]                  Refer to Patient Findings for HPI:  Patient Findings       Negatives:  Signs/symptoms of thrombosis, Signs/symptoms of bleeding, Laboratory test error suspected, Change in health, Change in alcohol use, Change in activity, Upcoming invasive procedure, Emergency department visit, Upcoming dental procedure, Missed doses, Extra doses, Change in medications, Change in diet/appetite, Hospital admission, Bruising, Other complaints            There were no vitals filed for this visit.  pt declined vitals    Verified current warfarin dosing schedule.    Medications reconciled   Pt is not on antiplatelet therapy      A/P   INR  -therapeutic. On low end of range - states she has been eating more salads lately. Will do a one time bolus dose.      Warfarin dosing recommendation: Increase warfarin to 6 mg tomorrow and resume previous warfarin dose.     Pt educated to contact our clinic with any changes in medications or s/s of bleeding or thrombosis. Pt is aware to seek immediate medical attention for falls, head injury or deep cuts.    Follow up appointment in 2  week(s).    Mackenzie James, PharmD

## 2023-06-09 ENCOUNTER — ANTICOAGULATION VISIT (OUTPATIENT)
Dept: VASCULAR LAB | Facility: MEDICAL CENTER | Age: 68
End: 2023-06-09
Attending: INTERNAL MEDICINE
Payer: COMMERCIAL

## 2023-06-09 DIAGNOSIS — K92.2 UPPER GI BLEED: ICD-10-CM

## 2023-06-09 DIAGNOSIS — I82.509 CHRONIC DEEP VEIN THROMBOSIS (DVT) OF LOWER EXTREMITY, UNSPECIFIED LATERALITY, UNSPECIFIED VEIN (HCC): ICD-10-CM

## 2023-06-09 DIAGNOSIS — Z79.01 CHRONIC ANTICOAGULATION: ICD-10-CM

## 2023-06-09 LAB — INR PPP: 1.3 (ref 2–3.5)

## 2023-06-09 PROCEDURE — 85610 PROTHROMBIN TIME: CPT

## 2023-06-09 PROCEDURE — 99212 OFFICE O/P EST SF 10 MIN: CPT

## 2023-06-09 NOTE — PROGRESS NOTES
Anticoagulation Summary  As of 2023      INR goal:  2.0-3.0   TTR:  68.8 % (2.3 mo)   INR used for dosin.30 (2023)   Warfarin maintenance plan:  3 mg (3 mg x 1) every Mon, Wed, Fri; 6 mg (3 mg x 2) all other days   Weekly warfarin total:  33 mg   Plan last modified:  Graham Muniz, PharmD (3/31/2023)   Next INR check:  2023   Target end date:  Indefinite    Indications    DVT (deep venous thrombosis) (HCC) [I82.409]  Chronic anticoagulation [Z79.01]  Upper GI bleed [K92.2]                 Anticoagulation Episode Summary       INR check location:      Preferred lab:      Send INR reminders to:      Comments:  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 DOAC too expensive. Has had gastric bypass.          Anticoagulation Care Providers       Provider Role Specialty Phone number    [facility] Referring  [phone]                  Refer to Patient Findings for HPI:  Patient Findings       Positives:  Change in medications (Using more ibuprofen d/t recent dental procedure and subsequent pain. Counseled to DC.)    Negatives:  Signs/symptoms of thrombosis, Signs/symptoms of bleeding, Laboratory test error suspected, Change in health, Change in alcohol use, Change in activity, Upcoming invasive procedure, Emergency department visit, Upcoming dental procedure, Missed doses, Extra doses, Change in diet/appetite, Hospital admission, Bruising, Other complaints            There were no vitals filed for this visit.    Verified current warfarin dosing schedule - pt was taking 3 mg of warfarin daily while using more NSAIDs as she thought they increased INR - counseled pt that this is not correct and that NSAIDs increase risk for GI bleed.    Medications reconciled   Pt is not on antiplatelet therapy      A/P   INR  SUB-therapeutic.     Warfarin dosing recommendation: Instructed pt to BOLUS x 1 dose w/ 6 mg today and to then resume her previous regimen.    Recommend Lovenox at f/u if pt's INR remains  sub-therapeutic. Deferred today d/t known cause of sub-therapeutic INR & pt has hx of GI bleed in the past and has been using more NSAIDs recently (also uses meloxicam despite our counseling to avoid).    Pt educated to contact our clinic with any changes in medications or s/s of bleeding or thrombosis. Pt is aware to seek immediate medical attention for falls, head injury or deep cuts.    Follow up appointment in 1 week(s) per pt - recommended closer f/u per our CPA.    Gaston Kam, FawnD, BCACP

## 2023-06-16 ENCOUNTER — ANTICOAGULATION VISIT (OUTPATIENT)
Dept: VASCULAR LAB | Facility: MEDICAL CENTER | Age: 68
End: 2023-06-16
Attending: INTERNAL MEDICINE
Payer: COMMERCIAL

## 2023-06-16 DIAGNOSIS — K92.2 UPPER GI BLEED: ICD-10-CM

## 2023-06-16 DIAGNOSIS — Z79.01 CHRONIC ANTICOAGULATION: ICD-10-CM

## 2023-06-16 LAB — INR PPP: 4.1 (ref 2–3.5)

## 2023-06-16 PROCEDURE — 85610 PROTHROMBIN TIME: CPT

## 2023-06-16 PROCEDURE — 99212 OFFICE O/P EST SF 10 MIN: CPT

## 2023-06-16 NOTE — PROGRESS NOTES
Anticoagulation Summary  As of 2023      INR goal:  2.0-3.0   TTR:  65.6 % (2.6 mo)   INR used for dosin.10 (2023)   Warfarin maintenance plan:  3 mg (3 mg x 1) every Mon, Wed, Fri; 6 mg (3 mg x 2) all other days   Weekly warfarin total:  33 mg   Plan last modified:  Graham Muniz PharmD (3/31/2023)   Next INR check:  2023   Target end date:  Indefinite    Indications    DVT (deep venous thrombosis) (HCC) [I82.409]  Chronic anticoagulation [Z79.01]  Upper GI bleed [K92.2]                 Anticoagulation Episode Summary       INR check location:      Preferred lab:      Send INR reminders to:      Comments:  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 DOAC too expensive. Has had gastric bypass.          Anticoagulation Care Providers       Provider Role Specialty Phone number    [facility] Referring  [phone]                  Refer to Patient Findings for HPI:  Patient Findings       Negatives:  Signs/symptoms of thrombosis, Signs/symptoms of bleeding, Laboratory test error suspected, Change in health, Change in alcohol use, Change in activity, Upcoming invasive procedure, Emergency department visit, Upcoming dental procedure, Missed doses, Extra doses, Change in medications, Change in diet/appetite, Hospital admission, Bruising, Other complaints            There were no vitals filed for this visit.  pt declined vitals    Verified current warfarin dosing schedule.    Medications reconciled   Pt is not on antiplatelet therapy      A/P   INR  SUPRA-therapeutic.     Warfarin dosing recommendation: Decrease warfarin to 1.5 mg today and 3 mg tomorrow. Then resume previous warfarin schedule.    Pt educated to contact our clinic with any changes in medications or s/s of bleeding or thrombosis. Pt is aware to seek immediate medical attention for falls, head injury or deep cuts.    Follow up appointment in 4 day(s).    Fawn EsposiotD

## 2023-06-20 ENCOUNTER — ANTICOAGULATION VISIT (OUTPATIENT)
Dept: VASCULAR LAB | Facility: MEDICAL CENTER | Age: 68
End: 2023-06-20
Attending: INTERNAL MEDICINE
Payer: COMMERCIAL

## 2023-06-20 DIAGNOSIS — K92.2 UPPER GI BLEED: ICD-10-CM

## 2023-06-20 DIAGNOSIS — I82.509 CHRONIC DEEP VEIN THROMBOSIS (DVT) OF LOWER EXTREMITY, UNSPECIFIED LATERALITY, UNSPECIFIED VEIN (HCC): ICD-10-CM

## 2023-06-20 DIAGNOSIS — Z79.01 CHRONIC ANTICOAGULATION: ICD-10-CM

## 2023-06-20 LAB — INR PPP: 1.7 (ref 2–3.5)

## 2023-06-20 PROCEDURE — 99212 OFFICE O/P EST SF 10 MIN: CPT

## 2023-06-20 PROCEDURE — 85610 PROTHROMBIN TIME: CPT

## 2023-06-20 NOTE — PROGRESS NOTES
Anticoagulation Summary  As of 2023      INR goal:  2.0-3.0   TTR:  64.3 % (2.7 mo)   INR used for dosin.70 (2023)   Warfarin maintenance plan:  6 mg (3 mg x 2) every Sun, Thu; 3 mg (3 mg x 1) all other days   Weekly warfarin total:  27 mg   Plan last modified:  Valerie Wood PharmD (2023)   Next INR check:  2023   Target end date:  Indefinite    Indications    DVT (deep venous thrombosis) ([facility]) [I82.409]  Chronic anticoagulation [Z79.01]  Upper GI bleed [K92.2]                 Anticoagulation Episode Summary       INR check location:      Preferred lab:      Send INR reminders to:      Comments:  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 DOAC too expensive. Has had gastric bypass.          Anticoagulation Care Providers       Provider Role Specialty Phone number    [facility] Referring  [phone]                  Refer to Patient Findings for HPI:  Patient Findings       Negatives:  Signs/symptoms of thrombosis, Signs/symptoms of bleeding, Laboratory test error suspected, Change in health, Change in alcohol use, Change in activity, Upcoming invasive procedure, Emergency department visit, Upcoming dental procedure, Missed doses, Extra doses, Change in medications, Change in diet/appetite, Hospital admission, Bruising, Other complaints            There were no vitals filed for this visit.    Verified current warfarin dosing schedule.    Medications reconciled   Pt is not on antiplatelet therapy      A/P   INR  sub-therapeutic.     Warfarin dosing recommendation: Begin new warfarin regimen as listed above    Pt educated to contact our clinic with any changes in medications or s/s of bleeding or thrombosis. Pt is aware to seek immediate medical attention for falls, head injury or deep cuts.    Follow up appointment in 2 week(s) as this is the soonest pt can return.    Valerie Wood, FawnD

## 2023-07-18 ENCOUNTER — ANTICOAGULATION VISIT (OUTPATIENT)
Dept: VASCULAR LAB | Facility: MEDICAL CENTER | Age: 68
End: 2023-07-18
Attending: INTERNAL MEDICINE
Payer: COMMERCIAL

## 2023-07-18 DIAGNOSIS — Z79.01 CHRONIC ANTICOAGULATION: ICD-10-CM

## 2023-07-18 DIAGNOSIS — K92.2 UPPER GI BLEED: ICD-10-CM

## 2023-07-18 LAB — INR PPP: 1.6 (ref 2–3.5)

## 2023-07-18 PROCEDURE — 99212 OFFICE O/P EST SF 10 MIN: CPT

## 2023-07-18 PROCEDURE — 85610 PROTHROMBIN TIME: CPT

## 2023-07-18 NOTE — PROGRESS NOTES
Anticoagulation Summary  As of 2023      INR goal:  2.0-3.0   TTR:  48.0 % (3.6 mo)   INR used for dosin.60 (2023)   Warfarin maintenance plan:  3 mg (3 mg x 1) every Mon, Fri; 4.5 mg (3 mg x 1.5) all other days   Weekly warfarin total:  28.5 mg   Plan last modified:  Mackenzie James PharmD (2023)   Next INR check:  2023   Target end date:  Indefinite    Indications    DVT (deep venous thrombosis) (HCC) [I82.409]  Chronic anticoagulation [Z79.01]  Upper GI bleed [K92.2]                 Anticoagulation Episode Summary       INR check location:      Preferred lab:      Send INR reminders to:      Comments:  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 DOAC too expensive. Has had gastric bypass.          Anticoagulation Care Providers       Provider Role Specialty Phone number    [facility] Referring  [phone]                  Refer to Patient Findings for HPI:  Patient Findings       Positives:  Change in diet/appetite (more vitamin K intake)    Negatives:  Signs/symptoms of thrombosis, Signs/symptoms of bleeding, Laboratory test error suspected, Change in health, Change in alcohol use, Change in activity, Upcoming invasive procedure, Emergency department visit, Upcoming dental procedure, Missed doses, Extra doses, Change in medications, Hospital admission, Bruising, Other complaints            There were no vitals filed for this visit.  pt declined vitals    Verified current warfarin dosing schedule.    Medications reconciled   Pt is not on antiplatelet therapy      A/P   INR  SUB-therapeutic.     Warfarin dosing recommendation: Change warfarin dosing as above (5.6% increase).     Pt educated to contact our clinic with any changes in medications or s/s of bleeding or thrombosis. Pt is aware to seek immediate medical attention for falls, head injury or deep cuts.    Follow up appointment in 1 week(s).    Fawn EspositoD

## 2023-08-08 ENCOUNTER — APPOINTMENT (OUTPATIENT)
Dept: VASCULAR LAB | Facility: MEDICAL CENTER | Age: 68
End: 2023-08-08
Attending: INTERNAL MEDICINE
Payer: COMMERCIAL

## 2023-09-23 ENCOUNTER — HOSPITAL ENCOUNTER (INPATIENT)
Facility: MEDICAL CENTER | Age: 68
LOS: 2 days | DRG: 378 | End: 2023-09-25
Attending: EMERGENCY MEDICINE | Admitting: INTERNAL MEDICINE
Payer: COMMERCIAL

## 2023-09-23 ENCOUNTER — APPOINTMENT (OUTPATIENT)
Dept: RADIOLOGY | Facility: MEDICAL CENTER | Age: 68
DRG: 378 | End: 2023-09-23
Attending: EMERGENCY MEDICINE
Payer: COMMERCIAL

## 2023-09-23 DIAGNOSIS — K92.2 GASTROINTESTINAL HEMORRHAGE, UNSPECIFIED GASTROINTESTINAL HEMORRHAGE TYPE: ICD-10-CM

## 2023-09-23 DIAGNOSIS — I82.4Y1 DEEP VEIN THROMBOSIS (DVT) OF PROXIMAL VEIN OF RIGHT LOWER EXTREMITY, UNSPECIFIED CHRONICITY (HCC): ICD-10-CM

## 2023-09-23 DIAGNOSIS — R79.1 ELEVATED INR: ICD-10-CM

## 2023-09-23 DIAGNOSIS — K25.4 GASTROINTESTINAL HEMORRHAGE ASSOCIATED WITH GASTRIC ULCER: ICD-10-CM

## 2023-09-23 PROBLEM — O22.30 DVT (DEEP VEIN THROMBOSIS) IN PREGNANCY: Status: ACTIVE | Noted: 2023-09-23

## 2023-09-23 LAB
ABO GROUP BLD: NORMAL
ALBUMIN SERPL BCP-MCNC: 3.1 G/DL (ref 3.2–4.9)
ALBUMIN/GLOB SERPL: 1.7 G/DL
ALP SERPL-CCNC: 54 U/L (ref 30–99)
ALT SERPL-CCNC: 14 U/L (ref 2–50)
ANION GAP SERPL CALC-SCNC: 12 MMOL/L (ref 7–16)
APTT PPP: 51.7 SEC (ref 24.7–36)
AST SERPL-CCNC: 19 U/L (ref 12–45)
BARCODED ABORH UBTYP: 600
BARCODED ABORH UBTYP: 6200
BARCODED PRD CODE UBPRD: NORMAL
BARCODED PRD CODE UBPRD: NORMAL
BARCODED UNIT NUM UBUNT: NORMAL
BARCODED UNIT NUM UBUNT: NORMAL
BASOPHILS # BLD AUTO: 0.2 % (ref 0–1.8)
BASOPHILS # BLD: 0.02 K/UL (ref 0–0.12)
BILIRUB SERPL-MCNC: 0.2 MG/DL (ref 0.1–1.5)
BLD GP AB SCN SERPL QL: NORMAL
BLOOD CULTURE HOLD CXBCH: NORMAL
BUN SERPL-MCNC: 53 MG/DL (ref 8–22)
CALCIUM ALBUM COR SERPL-MCNC: 8.6 MG/DL (ref 8.5–10.5)
CALCIUM SERPL-MCNC: 7.9 MG/DL (ref 8.5–10.5)
CFT BLD TEG: 3.4 MIN (ref 4.6–9.1)
CFT BLD TEG: 4 MIN (ref 4.6–9.1)
CFT P HPASE BLD TEG: 3.1 MIN (ref 4.3–8.3)
CFT P HPASE BLD TEG: 3.7 MIN (ref 4.3–8.3)
CHLORIDE SERPL-SCNC: 112 MMOL/L (ref 96–112)
CLOT ANGLE BLD TEG: 77.1 DEGREES (ref 63–78)
CLOT ANGLE BLD TEG: 77.5 DEGREES (ref 63–78)
CO2 SERPL-SCNC: 18 MMOL/L (ref 20–33)
COMPONENT R 8504R: NORMAL
COMPONENT R 8504R: NORMAL
CREAT SERPL-MCNC: 0.76 MG/DL (ref 0.5–1.4)
CT.EXTRINSIC BLD ROTEM: 0.8 MIN (ref 0.8–2.1)
CT.EXTRINSIC BLD ROTEM: 0.9 MIN (ref 0.8–2.1)
EKG IMPRESSION: NORMAL
EOSINOPHIL # BLD AUTO: 0.01 K/UL (ref 0–0.51)
EOSINOPHIL NFR BLD: 0.1 % (ref 0–6.9)
ERYTHROCYTE [DISTWIDTH] IN BLOOD BY AUTOMATED COUNT: 55.2 FL (ref 35.9–50)
FERRITIN SERPL-MCNC: 66.7 NG/ML (ref 10–291)
GFR SERPLBLD CREATININE-BSD FMLA CKD-EPI: 85 ML/MIN/1.73 M 2
GLOBULIN SER CALC-MCNC: 1.8 G/DL (ref 1.9–3.5)
GLUCOSE SERPL-MCNC: 152 MG/DL (ref 65–99)
HCT VFR BLD AUTO: 21.2 % (ref 37–47)
HGB BLD-MCNC: 5.4 G/DL (ref 12–16)
HGB BLD-MCNC: 7.3 G/DL (ref 12–16)
IMM GRANULOCYTES # BLD AUTO: 0.03 K/UL (ref 0–0.11)
IMM GRANULOCYTES NFR BLD AUTO: 0.4 % (ref 0–0.9)
INR PPP: 7.48 (ref 0.87–1.13)
IRON SATN MFR SERPL: 21 % (ref 15–55)
IRON SERPL-MCNC: 51 UG/DL (ref 40–170)
LIPASE SERPL-CCNC: 15 U/L (ref 11–82)
LYMPHOCYTES # BLD AUTO: 0.51 K/UL (ref 1–4.8)
LYMPHOCYTES NFR BLD: 6 % (ref 22–41)
MCF BLD TEG: 64.2 MM (ref 52–69)
MCF BLD TEG: 64.4 MM (ref 52–69)
MCF.PLATELET INHIB BLD ROTEM: 25.2 MM (ref 15–32)
MCF.PLATELET INHIB BLD ROTEM: 25.2 MM (ref 15–32)
MCH RBC QN AUTO: 36 PG (ref 27–33)
MCHC RBC AUTO-ENTMCNC: 34.4 G/DL (ref 32.2–35.5)
MCV RBC AUTO: 104.4 FL (ref 81.4–97.8)
MONOCYTES # BLD AUTO: 0.41 K/UL (ref 0–0.85)
MONOCYTES NFR BLD AUTO: 4.8 % (ref 0–13.4)
NEUTROPHILS # BLD AUTO: 7.56 K/UL (ref 1.82–7.42)
NEUTROPHILS NFR BLD: 88.5 % (ref 44–72)
NRBC # BLD AUTO: 0 K/UL
NRBC BLD-RTO: 0 /100 WBC (ref 0–0.2)
PLATELET # BLD AUTO: 232 K/UL (ref 164–446)
PMV BLD AUTO: 10.3 FL (ref 9–12.9)
POTASSIUM SERPL-SCNC: 4.3 MMOL/L (ref 3.6–5.5)
PRODUCT TYPE UPROD: NORMAL
PRODUCT TYPE UPROD: NORMAL
PROT SERPL-MCNC: 4.9 G/DL (ref 6–8.2)
PROTHROMBIN TIME: 64.9 SEC (ref 12–14.6)
RBC # BLD AUTO: 2.03 M/UL (ref 4.2–5.4)
RH BLD: NORMAL
SODIUM SERPL-SCNC: 142 MMOL/L (ref 135–145)
TEG ALGORITHM TGALG: ABNORMAL
TEG ALGORITHM TGALG: ABNORMAL
TIBC SERPL-MCNC: 238 UG/DL (ref 250–450)
TROPONIN T SERPL-MCNC: 12 NG/L (ref 6–19)
UIBC SERPL-MCNC: 187 UG/DL (ref 110–370)
UNIT STATUS USTAT: NORMAL
UNIT STATUS USTAT: NORMAL
VIT B12 SERPL-MCNC: 176 PG/ML (ref 211–911)
WBC # BLD AUTO: 8.5 K/UL (ref 4.8–10.8)

## 2023-09-23 PROCEDURE — 96368 THER/DIAG CONCURRENT INF: CPT

## 2023-09-23 PROCEDURE — 96365 THER/PROPH/DIAG IV INF INIT: CPT

## 2023-09-23 PROCEDURE — 85384 FIBRINOGEN ACTIVITY: CPT | Mod: 91

## 2023-09-23 PROCEDURE — 86923 COMPATIBILITY TEST ELECTRIC: CPT

## 2023-09-23 PROCEDURE — 700111 HCHG RX REV CODE 636 W/ 250 OVERRIDE (IP): Performed by: INTERNAL MEDICINE

## 2023-09-23 PROCEDURE — 82607 VITAMIN B-12: CPT

## 2023-09-23 PROCEDURE — 85730 THROMBOPLASTIN TIME PARTIAL: CPT

## 2023-09-23 PROCEDURE — 84484 ASSAY OF TROPONIN QUANT: CPT

## 2023-09-23 PROCEDURE — 700111 HCHG RX REV CODE 636 W/ 250 OVERRIDE (IP): Mod: JZ | Performed by: EMERGENCY MEDICINE

## 2023-09-23 PROCEDURE — 83690 ASSAY OF LIPASE: CPT

## 2023-09-23 PROCEDURE — 96366 THER/PROPH/DIAG IV INF ADDON: CPT

## 2023-09-23 PROCEDURE — 96367 TX/PROPH/DG ADDL SEQ IV INF: CPT

## 2023-09-23 PROCEDURE — 85610 PROTHROMBIN TIME: CPT

## 2023-09-23 PROCEDURE — 700105 HCHG RX REV CODE 258: Performed by: EMERGENCY MEDICINE

## 2023-09-23 PROCEDURE — 71045 X-RAY EXAM CHEST 1 VIEW: CPT

## 2023-09-23 PROCEDURE — 99291 CRITICAL CARE FIRST HOUR: CPT

## 2023-09-23 PROCEDURE — 80053 COMPREHEN METABOLIC PANEL: CPT

## 2023-09-23 PROCEDURE — 770022 HCHG ROOM/CARE - ICU (200)

## 2023-09-23 PROCEDURE — 99291 CRITICAL CARE FIRST HOUR: CPT | Performed by: INTERNAL MEDICINE

## 2023-09-23 PROCEDURE — 86850 RBC ANTIBODY SCREEN: CPT

## 2023-09-23 PROCEDURE — 86900 BLOOD TYPING SEROLOGIC ABO: CPT

## 2023-09-23 PROCEDURE — 99232 SBSQ HOSP IP/OBS MODERATE 35: CPT | Performed by: SPECIALIST

## 2023-09-23 PROCEDURE — 36415 COLL VENOUS BLD VENIPUNCTURE: CPT

## 2023-09-23 PROCEDURE — 700105 HCHG RX REV CODE 258: Performed by: INTERNAL MEDICINE

## 2023-09-23 PROCEDURE — 85025 COMPLETE CBC W/AUTO DIFF WBC: CPT

## 2023-09-23 PROCEDURE — 83550 IRON BINDING TEST: CPT

## 2023-09-23 PROCEDURE — 85576 BLOOD PLATELET AGGREGATION: CPT | Mod: 91

## 2023-09-23 PROCEDURE — C9113 INJ PANTOPRAZOLE SODIUM, VIA: HCPCS | Performed by: INTERNAL MEDICINE

## 2023-09-23 PROCEDURE — 85018 HEMOGLOBIN: CPT

## 2023-09-23 PROCEDURE — 82728 ASSAY OF FERRITIN: CPT

## 2023-09-23 PROCEDURE — 96375 TX/PRO/DX INJ NEW DRUG ADDON: CPT

## 2023-09-23 PROCEDURE — 83540 ASSAY OF IRON: CPT

## 2023-09-23 PROCEDURE — 36430 TRANSFUSION BLD/BLD COMPNT: CPT

## 2023-09-23 PROCEDURE — 93005 ELECTROCARDIOGRAM TRACING: CPT | Performed by: EMERGENCY MEDICINE

## 2023-09-23 PROCEDURE — C9113 INJ PANTOPRAZOLE SODIUM, VIA: HCPCS | Performed by: EMERGENCY MEDICINE

## 2023-09-23 PROCEDURE — 86901 BLOOD TYPING SEROLOGIC RH(D): CPT

## 2023-09-23 PROCEDURE — 700101 HCHG RX REV CODE 250: Performed by: INTERNAL MEDICINE

## 2023-09-23 PROCEDURE — P9016 RBC LEUKOCYTES REDUCED: HCPCS

## 2023-09-23 PROCEDURE — 85347 COAGULATION TIME ACTIVATED: CPT | Mod: 91

## 2023-09-23 RX ORDER — WARFARIN SODIUM 3 MG/1
3-4.5 TABLET ORAL DAILY
Status: ON HOLD | COMMUNITY
End: 2023-09-25

## 2023-09-23 RX ORDER — MELOXICAM 15 MG/1
15 TABLET ORAL DAILY
Status: ON HOLD | COMMUNITY
End: 2023-09-25

## 2023-09-23 RX ORDER — GABAPENTIN 600 MG/1
600 TABLET ORAL
COMMUNITY

## 2023-09-23 RX ORDER — OXYCODONE HYDROCHLORIDE AND ACETAMINOPHEN 5; 325 MG/1; MG/1
0.5 TABLET ORAL 2 TIMES DAILY
COMMUNITY

## 2023-09-23 RX ORDER — CYCLOSPORINE 0.5 MG/ML
1 EMULSION OPHTHALMIC 3 TIMES DAILY PRN
COMMUNITY

## 2023-09-23 RX ADMIN — PANTOPRAZOLE SODIUM 80 MG: 40 INJECTION, POWDER, FOR SOLUTION INTRAVENOUS at 15:03

## 2023-09-23 RX ADMIN — PANTOPRAZOLE SODIUM 8 MG/HR: 40 INJECTION, POWDER, FOR SOLUTION INTRAVENOUS at 17:20

## 2023-09-23 RX ADMIN — CEFTRIAXONE SODIUM 1000 MG: 10 INJECTION, POWDER, FOR SOLUTION INTRAVENOUS at 17:13

## 2023-09-23 RX ADMIN — PROTHROMBIN, COAGULATION FACTOR VII HUMAN, COAGULATION FACTOR IX HUMAN, COAGULATION FACTOR X HUMAN, PROTEIN C, PROTEIN S HUMAN, AND WATER 2000 UNITS: KIT at 16:06

## 2023-09-23 RX ADMIN — PHYTONADIONE 10 MG: 10 INJECTION, EMULSION INTRAMUSCULAR; INTRAVENOUS; SUBCUTANEOUS at 16:12

## 2023-09-23 ASSESSMENT — ENCOUNTER SYMPTOMS
VOMITING: 1
DIZZINESS: 1

## 2023-09-23 ASSESSMENT — FIBROSIS 4 INDEX: FIB4 SCORE: 1.49

## 2023-09-23 NOTE — ED NOTES
Bedside report from MARTY Son. Pt resting in [facility] comfortably, on monitor, call light in reach.  Pt is on RA, GCS 15.  Necessary fall precautions in place. Family at bedside  Pt awaiting CT.

## 2023-09-23 NOTE — CONSULTS
Critical Care Consultation    Date of consult: 9/23/2023    Referring Physician  Phillip Garza M.D.    Reason for Consultation  GI bleed    History of Presenting Illness  68 y.o. female with hx of DVT on Coumadin who presented 9/23/2023 with a syncopal episode last night two episodes of vomiting dark emesis this am. EMS was called to her home where she was found to have a BP 80/40. In the ED she was found to have Hb-7 and INR-7.8. Her coagulopathy was reversed with PCC and GI has consulted. Critical care consultation was requested for further evaluation and management.    She tells me she had a previous episode of upper GI bleeding in March 2023 attributed to supratherapeutic INR.  She denies any recent changes in her diet or antibiotic administration.  She did take an Advil for back pain yesterday.        Code Status  No Order    Review of Systems  Review of Systems   Gastrointestinal:  Positive for vomiting.   Neurological:  Positive for dizziness.       Past Medical History   has no past medical history on file.    Surgical History   has no past surgical history on file.    Family History  family history is not on file.    Social History   reports that she has never smoked. She has never used smokeless tobacco. She reports current alcohol use. She reports that she does not use drugs.    Medications  Home Medications       Reviewed by Abena Haq (Pharmacy Tech) on 09/23/23 at 1641  Med List Status: Complete     Medication Last Dose Status   cycloSPORINE (RESTASIS) 0.05 % ophthalmic emulsion FEW DAYS AGO Active   gabapentin (NEURONTIN) 600 MG tablet 9/22/2023 Active   meloxicam (MOBIC) 15 MG tablet 9/22/2023 Active   oxyCODONE-acetaminophen (PERCOCET) 5-325 MG Tab 9/22/2023 Active   warfarin (COUMADIN) 3 MG Tab 9/22/2023 Active                  Current Facility-Administered Medications   Medication Dose Route Frequency Provider Last Rate Last Admin    pantoprazole (Protonix) 80 mg in  mL infusion  8  mg/hr Intravenous Continuous Phillip Garza M.D.   Continue to Floor at 09/23/23 2047    cefTRIAXone (Rocephin) syringe 1,000 mg  1,000 mg Intravenous Q24HRS Phillip Garza M.D.   1,000 mg at 09/23/23 1713    Pharmacy Consult Request   Other PHARMACY TO DOSE Phillip Garza M.D.           Allergies  No Known Allergies    Vital Signs last 24 hours  Temp:  [36.7 °C (98 °F)-37 °C (98.6 °F)] 37 °C (98.6 °F)  Pulse:  [] 93  Resp:  [11-21] 17  BP: (103-117)/(55-68) 114/63  SpO2:  [95 %-98 %] 96 %      Physical Exam  Constitutional:       Appearance: She is ill-appearing.   HENT:      Mouth/Throat:      Mouth: Mucous membranes are dry.   Eyes:      Extraocular Movements: Extraocular movements intact.      Pupils: Pupils are equal, round, and reactive to light.   Cardiovascular:      Rate and Rhythm: Normal rate and regular rhythm.      Heart sounds: No murmur heard.     No friction rub. No gallop.   Pulmonary:      Effort: Pulmonary effort is normal. No respiratory distress.      Breath sounds: Normal breath sounds.   Abdominal:      General: Abdomen is flat. There is no distension.      Palpations: Abdomen is soft.      Tenderness: There is no abdominal tenderness. There is no guarding or rebound.   Musculoskeletal:      Cervical back: Neck supple.   Skin:     Comments: Pale warm dry   Neurological:      General: No focal deficit present.      Mental Status: She is alert and oriented to person, place, and time.      Cranial Nerves: No cranial nerve deficit.   Psychiatric:         Mood and Affect: Mood normal.         Behavior: Behavior normal.         Fluids  No intake or output data in the 24 hours ending 09/23/23 2155    Laboratory  Recent Results (from the past 48 hour(s))   COD (ADULT)    Collection Time: 09/23/23  2:20 PM   Result Value Ref Range    ABO Grouping Only A     Rh Grouping Only POS     Antibody Screen-Cod NEG    CBC WITH DIFFERENTIAL    Collection Time: 09/23/23  2:27 PM   Result Value Ref Range     WBC 8.5 4.8 - 10.8 K/uL    RBC 2.03 (L) 4.20 - 5.40 M/uL    Hemoglobin 7.3 (L) 12.0 - 16.0 g/dL    Hematocrit 21.2 (L) 37.0 - 47.0 %    .4 (H) 81.4 - 97.8 fL    MCH 36.0 (H) 27.0 - 33.0 pg    MCHC 34.4 32.2 - 35.5 g/dL    RDW 55.2 (H) 35.9 - 50.0 fL    Platelet Count 232 164 - 446 K/uL    MPV 10.3 9.0 - 12.9 fL    Neutrophils-Polys 88.50 (H) 44.00 - 72.00 %    Lymphocytes 6.00 (L) 22.00 - 41.00 %    Monocytes 4.80 0.00 - 13.40 %    Eosinophils 0.10 0.00 - 6.90 %    Basophils 0.20 0.00 - 1.80 %    Immature Granulocytes 0.40 0.00 - 0.90 %    Nucleated RBC 0.00 0.00 - 0.20 /100 WBC    Neutrophils (Absolute) 7.56 (H) 1.82 - 7.42 K/uL    Lymphs (Absolute) 0.51 (L) 1.00 - 4.80 K/uL    Monos (Absolute) 0.41 0.00 - 0.85 K/uL    Eos (Absolute) 0.01 0.00 - 0.51 K/uL    Baso (Absolute) 0.02 0.00 - 0.12 K/uL    Immature Granulocytes (abs) 0.03 0.00 - 0.11 K/uL    NRBC (Absolute) 0.00 K/uL   COMP METABOLIC PANEL    Collection Time: 09/23/23  2:27 PM   Result Value Ref Range    Sodium 142 135 - 145 mmol/L    Potassium 4.3 3.6 - 5.5 mmol/L    Chloride 112 96 - 112 mmol/L    Co2 18 (L) 20 - 33 mmol/L    Anion Gap 12.0 7.0 - 16.0    Glucose 152 (H) 65 - 99 mg/dL    Bun 53 (H) 8 - 22 mg/dL    Creatinine 0.76 0.50 - 1.40 mg/dL    Calcium 7.9 (L) 8.5 - 10.5 mg/dL    Correct Calcium 8.6 8.5 - 10.5 mg/dL    AST(SGOT) 19 12 - 45 U/L    ALT(SGPT) 14 2 - 50 U/L    Alkaline Phosphatase 54 30 - 99 U/L    Total Bilirubin 0.2 0.1 - 1.5 mg/dL    Albumin 3.1 (L) 3.2 - 4.9 g/dL    Total Protein 4.9 (L) 6.0 - 8.2 g/dL    Globulin 1.8 (L) 1.9 - 3.5 g/dL    A-G Ratio 1.7 g/dL   LIPASE    Collection Time: 09/23/23  2:27 PM   Result Value Ref Range    Lipase 15 11 - 82 U/L   PROTHROMBIN TIME    Collection Time: 09/23/23  2:27 PM   Result Value Ref Range    PT 64.9 (HH) 12.0 - 14.6 sec    INR 7.48 (HH) 0.87 - 1.13   APTT    Collection Time: 09/23/23  2:27 PM   Result Value Ref Range    APTT 51.7 (H) 24.7 - 36.0 sec   TROPONIN    Collection  Time: 09/23/23  2:27 PM   Result Value Ref Range    Troponin T 12 6 - 19 ng/L   ESTIMATED GFR    Collection Time: 09/23/23  2:27 PM   Result Value Ref Range    GFR (CKD-EPI) 85 >60 mL/min/1.73 m 2   Blood Culture,Hold    Collection Time: 09/23/23  2:27 PM   Result Value Ref Range    Blood Culture Hold Collected    VITAMIN B12    Collection Time: 09/23/23  2:27 PM   Result Value Ref Range    Vitamin B12 -True Cobalamin 176 (L) 211 - 911 pg/mL   FERRITIN    Collection Time: 09/23/23  2:27 PM   Result Value Ref Range    Ferritin 66.7 10.0 - 291.0 ng/mL   IRON/TOTAL IRON BIND    Collection Time: 09/23/23  2:27 PM   Result Value Ref Range    Iron 51 40 - 170 ug/dL    Total Iron Binding 238 (L) 250 - 450 ug/dL    Unsat Iron Binding 187 110 - 370 ug/dL    % Saturation 21 15 - 55 %   Basic TEG    Collection Time: 09/23/23  4:55 PM   Result Value Ref Range    Reaction Time Initial-R 4.0 (L) 4.6 - 9.1 min    React Time Initial Hep 3.7 (L) 4.3 - 8.3 min    Clot Kinetics-K 0.8 0.8 - 2.1 min    Clot Angle-Angle 77.1 63.0 - 78.0 degrees    Maximum Clot Strength-MA 64.2 52.0 - 69.0 mm    TEG Functional Fibrinogen(MA) 25.2 15.0 - 32.0 mm    TEG Algorithm Link Algorithm        Imaging  DX-CHEST-PORTABLE (1 VIEW)   Final Result      No acute cardiopulmonary abnormality.             Assessment/Plan  * GI (gastrointestinal bleed)- (present on admission)  Assessment & Plan  Serial H&H Q8  Continue Protonix infusion  Ceftriaxone 1 g IV daily x7 days  Restrictive transfusion strategies  Repeat TEG status post PCC administration  Transfused to correct coagulopathy  GI consulted  EGD plan for morning of 9/24/2023    DVT (deep vein thrombosis) in pregnancy  Assessment & Plan  Holding anticoagulation for GI bleed        Discussed patient condition and risk of morbidity and/or mortality with RN, Patient, and ERP .      This patient remains critically ill.  I have assessed and reassessed the blood pressure, hemodynamics, cardiovascular status.   I am actively administering blood transfusions and guiding further blood transfusions as guided by analysis of the coagulation panels.  This patient remains at high risk for worsening shock and death without the above critical care interventions.     Critical care time = 60 minutes in directly providing and coordinating critical care and extensive data review.  No time overlap and excludes procedures.

## 2023-09-23 NOTE — CONSULTS
GASTROENTEROLOGY CONSULTATION    PATIENT NAME: Ana Maria Raza  : 1955  CSN: [id number]  MRN:  [id number]     CONSULTATION DATE:  2023    PRIMARY CARE PROVIDER:  No primary care provider on file.      REASON FOR CONSULT:  Anemia, syncopal episode, hematemesis    HISTORY OF PRESENT ILLNESS:  Ana Maria Raza is a 68 y.o. female with history of gastric bypass who presents to the ED for evaluation of a syncopal episode onset last night. The patient explains she began feeling lightheaded yesterday at work around noon. She adds that every instance she sits up, she feels hot and sweaty. She reports the first syncopal episode at 10:00 PM last night in the bathroom where she was found hanging over the bathtub. She called her  but she says it was dark and she couldn't tell if she was bleeding or not. She states that she is experienced two episodes of dark emesis that began this morning that her  witnessed. He said that her stool was the color of her vomit. . 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 She has not recently been to Coumadin clinic or followed up with doctors due to cost.  She notes taking her last dose of coumadin yesterday around 4:30 AM. Her INR is 7. She has not checked her INR since April. She also takes one Advil pill for her back pain the past several days.  She does say that she had bleeding in March of this year and had endoscopy. Unfortunately it is not in chart yet. They are trying to merge charts so we can evaluate old records.     She drinks one drink per night.  Her hemoglobin is 7.3.    PAST MEDICAL HISTORY:  History reviewed. No pertinent past medical history.    PAST SURGICAL HISTORY:  History reviewed. No pertinent surgical history.     CURRENT MEDS:  Current Facility-Administered Medications   Medication Dose Route Frequency Provider Last Rate Last Admin    prothrombin complex conc human  "(Kcentra) 2,000 Units infusion  2,000 Units Intravenous Once Reuben Perez M.D. 160 mL/hr at 09/23/23 1606 2,000 Units at 09/23/23 1606    And    phytonadione (Aqua-Mephyton) 10 mg in NS 50 mL IVPB  10 mg Intravenous Once Reuben Perez M.D. 100 mL/hr at 09/23/23 1612 10 mg at 09/23/23 1612     No current outpatient medications on file.        ALLERGIES:  Not on File    SOCIAL HISTORY:  Social History     Socioeconomic History    Marital status: Not on file     Spouse name: Not on file    Number of children: Not on file    Years of education: Not on file    Highest education level: Not on file   Occupational History    Not on file   Tobacco Use    Smoking status: Never    Smokeless tobacco: Never   Vaping Use    Vaping Use: Never used   Substance and Sexual Activity    Alcohol use: Yes     Comment: 1 wine cooler per night    Drug use: Never    Sexual activity: Not on file   Other Topics Concern    Not on file   Social History Narrative    Not on file     Social Determinants of Health     Financial Resource Strain: Not on file   Food Insecurity: Not on file   Transportation Needs: Not on file   Physical Activity: Not on file   Stress: Not on file   Social Connections: Not on file   Intimate Partner Violence: Not on file   Housing Stability: Not on file       FAMILY HISTORY:  History reviewed. No pertinent family history.     REVIEW OF SYSTEMS:  General ROS: Negative for - chills, fever, night sweats or weight loss.  HEENT ROS: Negative  Respiratory ROS: Negative for - cough or shortness of breath.  Cardiovascular ROS:  Negative for - chest pain or palpitations.  Gastrointestinal ROS: As per the history of present illness.  Genito-Urinary ROS: Negative  Musculoskeletal ROS: Negative.  Neurological ROS: Negative  Skin ROS: negative  Hematology ROS: negative  Endocrinology ROS: Negative        PHYSICAL EXAM:  VITALS: /68   Pulse 94   Temp 36.7 °C (98 °F) (Temporal)   Resp 19   Ht 1.651 m (5' 5\")   Wt 68 " "kg (150 lb)   SpO2 95%   BMI 24.96 kg/m²   GEN:  Ana Maria Raza is a 68 y.o. female in no acute distress. But very pale  HEENT:         Sclera anicteric.    NECK:    Neck supple without lymphadenopathy or thyromegaly.  LUNGS: Clear to auscultation posteriorly.  HEART: Regular rate and rhythm. S1 and S2 normal. No murmurs, gallops  ABD:  + BS nt/nd -hsm  RECTAL: Not done at this time.  EXT:  Without cyanosis, deformity or pitting edema.  SKIN:  Pink, warm, dry.  NEURO: Grossly intact, A/OR.    LABS:  Recent Labs     09/23/23  1427   WBC 8.5   .4*     Recent Labs     09/23/23  1427   GLUCOSE 152*   BUN 53*   CO2 18*     Lab Results   Component Value Date    INR 7.48 () 09/23/2023     No components found for: \"ALT\", \"AST\", \"GGT\", \"ALKPHOS\"  No results found for: \"BILINEO\"      @LASTIMGCAT([id number])@     @LASTGCAT([id number])@       IMPRESSION/PLAN:  Syncopal episode due to GI blood loss  Hematemesis  Melena  Anti-coagulated - supra therapeutic  Macrocytosis - check b12 due  to gastric bypass  Needs K centra and Vit K  Needs transfusion of blood  PPI  EGD in am unless she actively bleeds tonight. It would be preferable for her to be reversed and have blood on board. She has not bleed since early this am.   Get records from lat EGD       Catie Sharam M.D.  Gastroenterology      "

## 2023-09-23 NOTE — ED NOTES
Med rec completed per pt   Allergies reviewed   No PO antibiotics in the last 30 days    Pt takes Warfarin daily   4.5 mg once a week   3 mg all other days   Last dose 9/22/2023 AM

## 2023-09-23 NOTE — ED PROVIDER NOTES
ER Provider Note    Scribed for Phillip Garza M.d. by Lázaro Parikh. 9/23/2023  2:04 PM    Primary Care Provider: No primary care provider noted.    CHIEF COMPLAINT  Chief Complaint   Patient presents with    Weakness     BIB [facility] from home. Patient had possible syncopal episode while on toilet. BP on EMS arrival was 80/60 with HR of 120. They placed IV and gave 500cc NS and 4mg zofran. Patient then had /64, HR of 88. Patient reports feeling dizzy and like she was going to pass out. Denies chest pain. She has had 3 falls in past 24 hours. PMH DVT, is on coumadin but has not had INR checked since April. Patient's  reports dark vomit     HPI/ROS  LIMITATION TO HISTORY   Select: : None  OUTSIDE HISTORIAN(S):  Family members are present.    Rene Velasco is a 68 y.o. female who presents to the ED for evaluation of a syncopal episode onset last night. The patient explains she began feeling lightheaded yesterday at work around noon. She adds that every instance she sits up, she feels hot and sweaty. She reports the first syncopal episode at 10:00 PM last night in the bathroom where she was found hanging over the bathtub. She states that she experienced two episodes of dark emesis that began this morning. She denies any dark stool, or shortness of breath. She reports taking blood thinners for blood clots and has an AB fistula in her groin. She denies any history of blood clots in her lungs. She notes taking her last dose of coumadin yesterday around 4:30 AM. She also takes one Advil pill for her back pain the past several days. She denies any history of heart attack, heart disease, or ulcers. She reports drinking one wine cooler a night.    PAST MEDICAL HISTORY  History reviewed. No pertinent past medical history.    SURGICAL HISTORY  History reviewed. No pertinent surgical history.    FAMILY HISTORY  History reviewed. No pertinent family history.    SOCIAL HISTORY   reports that she has never smoked.  "She has never used smokeless tobacco. She reports current alcohol use. She reports that she does not use drugs.    CURRENT MEDICATIONS  Previous Medications    No medications noted.     ALLERGIES  Patient has no allergy information on record.    PHYSICAL EXAM  /62   Pulse (!) 102   Temp 36.7 °C (98 °F) (Temporal)   Resp 18   Ht 1.651 m (5' 5\")   Wt 68 kg (150 lb)   SpO2 97%   BMI 24.96 kg/m²     Constitutional: Ill appearing, pale.  HENT: No signs of trauma, Bilateral external ears normal, Nose normal. Uvula midline.   Eyes: Pupils are equal and reactive, Conjunctiva normal, Non-icteric.   Neck: Normal range of motion, No tenderness, Supple, No stridor.   Lymphatic: No lymphadenopathy noted.   Cardiovascular: Tachycardic rate and rhythm, no murmurs.   Thorax & Lungs: Normal breath sounds, No respiratory distress, No wheezing, No chest tenderness.   Abdomen: Bowel sounds normal, Soft, No tenderness, No peritoneal signs, No masses, No pulsatile masses.   Skin: Warm, Dry, No erythema, No rash.   Back: No bony tenderness, No CVA tenderness.   Extremities: Intact distal pulses, No edema, No tenderness, No cyanosis.  Musculoskeletal: Good range of motion in all major joints. No tenderness to palpation or major deformities noted.   Neurologic: Alert , Normal motor function, Normal sensory function, No focal deficits noted.   Psychiatric: Affect normal, Judgment normal, Mood normal.      DIAGNOSTIC STUDIES    Labs:   Labs Reviewed   CBC WITH DIFFERENTIAL - Abnormal; Notable for the following components:       Result Value    RBC 2.03 (*)     Hemoglobin 7.3 (*)     Hematocrit 21.2 (*)     .4 (*)     MCH 36.0 (*)     RDW 55.2 (*)     Neutrophils-Polys 88.50 (*)     Lymphocytes 6.00 (*)     Neutrophils (Absolute) 7.56 (*)     Lymphs (Absolute) 0.51 (*)     All other components within normal limits    Narrative:     Indicate which anticoagulants the patient is on:->UNKNOWN  Biotin intake of greater than 5 mg " per day may interfere with  troponin levels, causing false low values.   COMP METABOLIC PANEL - Abnormal; Notable for the following components:    Co2 18 (*)     Glucose 152 (*)     Bun 53 (*)     Calcium 7.9 (*)     Albumin 3.1 (*)     Total Protein 4.9 (*)     Globulin 1.8 (*)     All other components within normal limits    Narrative:     Indicate which anticoagulants the patient is on:->UNKNOWN  Biotin intake of greater than 5 mg per day may interfere with  troponin levels, causing false low values.   PROTHROMBIN TIME - Abnormal; Notable for the following components:    PT 64.9 (*)     INR 7.48 (*)     All other components within normal limits    Narrative:     Indicate which anticoagulants the patient is on:->UNKNOWN  Biotin intake of greater than 5 mg per day may interfere with  troponin levels, causing false low values.   APTT - Abnormal; Notable for the following components:    APTT 51.7 (*)     All other components within normal limits    Narrative:     Indicate which anticoagulants the patient is on:->UNKNOWN  Biotin intake of greater than 5 mg per day may interfere with  troponin levels, causing false low values.   BASIC TEG - Abnormal; Notable for the following components:    Reaction Time Initial-R 4.0 (*)     React Time Initial Hep 3.7 (*)     All other components within normal limits    Narrative:     Is the patient on heparin (including low molecular weight  heparin, subcutaneous heparin, or lovenox)?->No  Do you want to extend TEG graph to LY30? (If no, graph will  terminate at MA)->No   VITAMIN B12 - Abnormal; Notable for the following components:    Vitamin B12 -True Cobalamin 176 (*)     All other components within normal limits   IRON/TOTAL IRON BIND - Abnormal; Notable for the following components:    Total Iron Binding 238 (*)     All other components within normal limits    Narrative:     Indicate which anticoagulants the patient is on:->UNKNOWN  Biotin intake of greater than 5 mg per day may  "interfere with  troponin levels, causing false low values.   COD (ADULT)   LIPASE    Narrative:     Indicate which anticoagulants the patient is on:->UNKNOWN  Biotin intake of greater than 5 mg per day may interfere with  troponin levels, causing false low values.   TROPONIN    Narrative:     Indicate which anticoagulants the patient is on:->UNKNOWN  Biotin intake of greater than 5 mg per day may interfere with  troponin levels, causing false low values.   ESTIMATED GFR    Narrative:     Indicate which anticoagulants the patient is on:->UNKNOWN  Biotin intake of greater than 5 mg per day may interfere with  troponin levels, causing false low values.   BLOOD CULTURE,HOLD   FERRITIN   HGB   BASIC TEG    Narrative:     Is the patient on heparin (including low molecular weight  heparin, subcutaneous heparin, or lovenox)?->No  Do you want to extend TEG graph to LY30? (If no, graph will  terminate at MA)->No        EKG:    No results found for: \"STMTRPT\"         I have independently interpreted this EKG    Radiology:   The attending emergency physician has independently interpreted the diagnostic imaging associated with this visit and am waiting the final reading from the radiologist.   Preliminary interpretation is a follows: no ptx  Radiologist interpretation:   DX-CHEST-PORTABLE (1 VIEW)   Final Result      No acute cardiopulmonary abnormality.           COURSE & MEDICAL DECISION MAKING     ED Observation Status? No; Patient does not meet criteria for ED Observation.     INITIAL ASSESSMENT, COURSE AND PLAN  Care Narrative: 68 y.o. F p/w CC of dark vomit on anticoagulation    2:04 PM - Patient presents to the ED with syncopal episodes and dark emesis. Patient will be treated with Protonix 80 mg. Ordered for EKG, Troponin, APTT, Prothrombin Time, Lipase, CMP, CBC w/ Diff, COD, and DX-Chest to evaluate her symptoms.      2x large bore IVs placed. Type & screen sent.  Tachycardic, normotensive, + hemorraghic shock given " prior hypotension and syncope  Given empiric PPI for PUD.  No hx of variceal bleed.    Unclear etiology at this time.   CBC w/ acute blood loss anemia, w/o thrombocytopenia.  BMP w/ uremia, see above electrolyte abnormalities.    INR significantly elevated therefore this will require emergent reversal given ongoing GI bleed  GI consulted, recommend keep n.p.o. and if symptoms worsen would consider EGD overnight however resuscitation needed in IMCU at this time.  Patient admitted to IM.    The total critical care time on this patient is 40 minutes, resuscitating patient, speaking with admitting physician, and deciphering test results. This 40 minutes is exclusive of separately billable procedures.      4:17 PM - Spoke with Dr. Sharma, (GI), about the patient's condition about reversing the patient's elevated INR.    5 PM - I spoke with Dr. Garza (Hospitalist) who agreed to evaluate the patient for hospitalization.      CRITICAL CARE  The very real possibilty of a deterioration of this patient's condition required the highest level of my preparedness for sudden, emergent intervention.  I provided critical care services, which included medication orders, frequent reevaluations of the patient's condition and response to treatment, ordering and reviewing test results, and discussing the case with various consultants.  The critical care time associated with the care of the patient was 40 minutes. Review chart for interventions. This time is exclusive of any other billable procedures.        DISPOSITION AND DISCUSSIONS  I have discussed management of the patient with the following physicians and MALCOM's:  Dr. Sharma (GI), Dr. Garza (Hospitalist)    Discussion of management with other Hospitals in [place] or appropriate source(s): None     Barriers to care at this time, including but not limited to: Patient does not have established PCP.     FINAL DIANGOSIS  1. Gastrointestinal hemorrhage, unspecified gastrointestinal hemorrhage type    2.  Elevated INR       The critical care time associated with the care of the patient was 40 minutes.      ILázaro (Scribe), am scribing for, and in the presence of, Phillip Garza M.D..    Electronically signed by: Lázaro Parikh (Scribe), 9/23/2023    Phillip ALBERTO M.D. personally performed the services described in this documentation, as scribed by Lázaro Parikh in my presence, and it is both accurate and complete.     The note accurately reflects work and decisions made by me.  Reuben Perez M.D.  9/23/2023  10:21 PM

## 2023-09-23 NOTE — ED TRIAGE NOTES
Chief Complaint   Patient presents with    Weakness     BIB [facility] from home. Patient had possible syncopal episode while on toilet. BP on EMS arrival was 80/60 with HR of 120. They placed IV and gave 500cc NS and 4mg zofran. Patient then had /64, HR of 88. Patient reports feeling dizzy and like she was going to pass out. Denies chest pain. She has had 3 falls in past 24 hours. PMH DVT, is on coumadin but has not had INR checked since April. Patient's  reports dark vomit

## 2023-09-24 ENCOUNTER — APPOINTMENT (OUTPATIENT)
Dept: RADIOLOGY | Facility: MEDICAL CENTER | Age: 68
DRG: 378 | End: 2023-09-24
Attending: INTERNAL MEDICINE
Payer: COMMERCIAL

## 2023-09-24 ENCOUNTER — ANESTHESIA EVENT (OUTPATIENT)
Dept: SURGERY | Facility: MEDICAL CENTER | Age: 68
DRG: 378 | End: 2023-09-24
Payer: COMMERCIAL

## 2023-09-24 ENCOUNTER — ANESTHESIA (OUTPATIENT)
Dept: SURGERY | Facility: MEDICAL CENTER | Age: 68
DRG: 378 | End: 2023-09-24
Payer: COMMERCIAL

## 2023-09-24 PROBLEM — R79.1 SUPRATHERAPEUTIC INR: Status: ACTIVE | Noted: 2023-09-24

## 2023-09-24 PROBLEM — D62 ANEMIA DUE TO ACUTE BLOOD LOSS: Status: ACTIVE | Noted: 2023-09-24

## 2023-09-24 PROBLEM — Z98.84 HISTORY OF GASTRIC BYPASS: Status: ACTIVE | Noted: 2023-09-24

## 2023-09-24 PROBLEM — I82.401 DEEP VEIN THROMBOSIS (DVT) OF RIGHT LOWER EXTREMITY (HCC): Status: ACTIVE | Noted: 2023-09-23

## 2023-09-24 LAB
HCT VFR BLD AUTO: 25.2 % (ref 37–47)
HGB BLD-MCNC: 7.4 G/DL (ref 12–16)
HGB BLD-MCNC: 8.4 G/DL (ref 12–16)
INR PPP: 1.16 (ref 0.87–1.13)
PROTHROMBIN TIME: 14.9 SEC (ref 12–14.6)

## 2023-09-24 PROCEDURE — 770020 HCHG ROOM/CARE - TELE (206)

## 2023-09-24 PROCEDURE — 160048 HCHG OR STATISTICAL LEVEL 1-5: Performed by: INTERNAL MEDICINE

## 2023-09-24 PROCEDURE — 160035 HCHG PACU - 1ST 60 MINS PHASE I: Performed by: INTERNAL MEDICINE

## 2023-09-24 PROCEDURE — 700111 HCHG RX REV CODE 636 W/ 250 OVERRIDE (IP): Performed by: INTERNAL MEDICINE

## 2023-09-24 PROCEDURE — 99291 CRITICAL CARE FIRST HOUR: CPT | Performed by: INTERNAL MEDICINE

## 2023-09-24 PROCEDURE — 700101 HCHG RX REV CODE 250: Performed by: INTERNAL MEDICINE

## 2023-09-24 PROCEDURE — 30233N1 TRANSFUSION OF NONAUTOLOGOUS RED BLOOD CELLS INTO PERIPHERAL VEIN, PERCUTANEOUS APPROACH: ICD-10-PCS | Performed by: HOSPITALIST

## 2023-09-24 PROCEDURE — 0DB68ZX EXCISION OF STOMACH, VIA NATURAL OR ARTIFICIAL OPENING ENDOSCOPIC, DIAGNOSTIC: ICD-10-PCS | Performed by: INTERNAL MEDICINE

## 2023-09-24 PROCEDURE — 85014 HEMATOCRIT: CPT

## 2023-09-24 PROCEDURE — 43239 EGD BIOPSY SINGLE/MULTIPLE: CPT | Performed by: INTERNAL MEDICINE

## 2023-09-24 PROCEDURE — 700102 HCHG RX REV CODE 250 W/ 637 OVERRIDE(OP): Performed by: INTERNAL MEDICINE

## 2023-09-24 PROCEDURE — A9270 NON-COVERED ITEM OR SERVICE: HCPCS | Performed by: INTERNAL MEDICINE

## 2023-09-24 PROCEDURE — 86923 COMPATIBILITY TEST ELECTRIC: CPT

## 2023-09-24 PROCEDURE — 160203 HCHG ENDO MINUTES - 1ST 30 MINS LEVEL 4: Performed by: INTERNAL MEDICINE

## 2023-09-24 PROCEDURE — C9113 INJ PANTOPRAZOLE SODIUM, VIA: HCPCS | Performed by: INTERNAL MEDICINE

## 2023-09-24 PROCEDURE — 700105 HCHG RX REV CODE 258: Performed by: INTERNAL MEDICINE

## 2023-09-24 PROCEDURE — A9270 NON-COVERED ITEM OR SERVICE: HCPCS

## 2023-09-24 PROCEDURE — P9016 RBC LEUKOCYTES REDUCED: HCPCS

## 2023-09-24 PROCEDURE — 88312 SPECIAL STAINS GROUP 1: CPT

## 2023-09-24 PROCEDURE — 700102 HCHG RX REV CODE 250 W/ 637 OVERRIDE(OP)

## 2023-09-24 PROCEDURE — 160002 HCHG RECOVERY MINUTES (STAT): Performed by: INTERNAL MEDICINE

## 2023-09-24 PROCEDURE — 36430 TRANSFUSION BLD/BLD COMPNT: CPT

## 2023-09-24 PROCEDURE — 85018 HEMOGLOBIN: CPT | Mod: 91

## 2023-09-24 PROCEDURE — 160009 HCHG ANES TIME/MIN: Performed by: INTERNAL MEDICINE

## 2023-09-24 PROCEDURE — 99222 1ST HOSP IP/OBS MODERATE 55: CPT | Performed by: HOSPITALIST

## 2023-09-24 PROCEDURE — 85610 PROTHROMBIN TIME: CPT

## 2023-09-24 PROCEDURE — 88305 TISSUE EXAM BY PATHOLOGIST: CPT

## 2023-09-24 RX ORDER — OXYCODONE HCL 5 MG/5 ML
10 SOLUTION, ORAL ORAL
Status: DISCONTINUED | OUTPATIENT
Start: 2023-09-24 | End: 2023-09-24 | Stop reason: HOSPADM

## 2023-09-24 RX ORDER — MEPERIDINE HYDROCHLORIDE 25 MG/ML
12.5 INJECTION INTRAMUSCULAR; INTRAVENOUS; SUBCUTANEOUS
Status: DISCONTINUED | OUTPATIENT
Start: 2023-09-24 | End: 2023-09-24 | Stop reason: HOSPADM

## 2023-09-24 RX ORDER — ONDANSETRON 2 MG/ML
4 INJECTION INTRAMUSCULAR; INTRAVENOUS EVERY 4 HOURS PRN
Status: DISCONTINUED | OUTPATIENT
Start: 2023-09-24 | End: 2023-09-25 | Stop reason: HOSPADM

## 2023-09-24 RX ORDER — LABETALOL HYDROCHLORIDE 5 MG/ML
5 INJECTION, SOLUTION INTRAVENOUS
Status: DISCONTINUED | OUTPATIENT
Start: 2023-09-24 | End: 2023-09-24 | Stop reason: HOSPADM

## 2023-09-24 RX ORDER — ONDANSETRON 2 MG/ML
4 INJECTION INTRAMUSCULAR; INTRAVENOUS
Status: DISCONTINUED | OUTPATIENT
Start: 2023-09-24 | End: 2023-09-24 | Stop reason: HOSPADM

## 2023-09-24 RX ORDER — HYDROMORPHONE HYDROCHLORIDE 1 MG/ML
0.4 INJECTION, SOLUTION INTRAMUSCULAR; INTRAVENOUS; SUBCUTANEOUS
Status: DISCONTINUED | OUTPATIENT
Start: 2023-09-24 | End: 2023-09-24 | Stop reason: HOSPADM

## 2023-09-24 RX ORDER — DIPHENHYDRAMINE HYDROCHLORIDE 50 MG/ML
12.5 INJECTION INTRAMUSCULAR; INTRAVENOUS
Status: DISCONTINUED | OUTPATIENT
Start: 2023-09-24 | End: 2023-09-24 | Stop reason: HOSPADM

## 2023-09-24 RX ORDER — BISACODYL 10 MG
10 SUPPOSITORY, RECTAL RECTAL
Status: DISCONTINUED | OUTPATIENT
Start: 2023-09-24 | End: 2023-09-25 | Stop reason: HOSPADM

## 2023-09-24 RX ORDER — ONDANSETRON 4 MG/1
4 TABLET, ORALLY DISINTEGRATING ORAL EVERY 4 HOURS PRN
Status: DISCONTINUED | OUTPATIENT
Start: 2023-09-24 | End: 2023-09-25 | Stop reason: HOSPADM

## 2023-09-24 RX ORDER — ACETAMINOPHEN 325 MG/1
650 TABLET ORAL ONCE
Status: COMPLETED | OUTPATIENT
Start: 2023-09-24 | End: 2023-09-24

## 2023-09-24 RX ORDER — ACETAMINOPHEN 325 MG/1
650 TABLET ORAL EVERY 6 HOURS PRN
Status: DISCONTINUED | OUTPATIENT
Start: 2023-09-24 | End: 2023-09-25 | Stop reason: HOSPADM

## 2023-09-24 RX ORDER — SODIUM CHLORIDE, SODIUM LACTATE, POTASSIUM CHLORIDE, CALCIUM CHLORIDE 600; 310; 30; 20 MG/100ML; MG/100ML; MG/100ML; MG/100ML
INJECTION, SOLUTION INTRAVENOUS
Status: DISCONTINUED | OUTPATIENT
Start: 2023-09-24 | End: 2023-09-24 | Stop reason: SURG

## 2023-09-24 RX ORDER — HYDRALAZINE HYDROCHLORIDE 20 MG/ML
5 INJECTION INTRAMUSCULAR; INTRAVENOUS
Status: DISCONTINUED | OUTPATIENT
Start: 2023-09-24 | End: 2023-09-24 | Stop reason: HOSPADM

## 2023-09-24 RX ORDER — OXYCODONE HCL 5 MG/5 ML
5 SOLUTION, ORAL ORAL
Status: DISCONTINUED | OUTPATIENT
Start: 2023-09-24 | End: 2023-09-24 | Stop reason: HOSPADM

## 2023-09-24 RX ORDER — LIDOCAINE HYDROCHLORIDE 20 MG/ML
INJECTION, SOLUTION EPIDURAL; INFILTRATION; INTRACAUDAL; PERINEURAL PRN
Status: DISCONTINUED | OUTPATIENT
Start: 2023-09-24 | End: 2023-09-24 | Stop reason: SURG

## 2023-09-24 RX ORDER — AMOXICILLIN 250 MG
2 CAPSULE ORAL 2 TIMES DAILY
Status: DISCONTINUED | OUTPATIENT
Start: 2023-09-24 | End: 2023-09-25 | Stop reason: HOSPADM

## 2023-09-24 RX ORDER — HYDROMORPHONE HYDROCHLORIDE 1 MG/ML
0.2 INJECTION, SOLUTION INTRAMUSCULAR; INTRAVENOUS; SUBCUTANEOUS
Status: DISCONTINUED | OUTPATIENT
Start: 2023-09-24 | End: 2023-09-24 | Stop reason: HOSPADM

## 2023-09-24 RX ORDER — HYDROMORPHONE HYDROCHLORIDE 1 MG/ML
0.1 INJECTION, SOLUTION INTRAMUSCULAR; INTRAVENOUS; SUBCUTANEOUS
Status: DISCONTINUED | OUTPATIENT
Start: 2023-09-24 | End: 2023-09-24 | Stop reason: HOSPADM

## 2023-09-24 RX ORDER — HALOPERIDOL 5 MG/ML
1 INJECTION INTRAMUSCULAR
Status: DISCONTINUED | OUTPATIENT
Start: 2023-09-24 | End: 2023-09-24 | Stop reason: HOSPADM

## 2023-09-24 RX ORDER — POLYETHYLENE GLYCOL 3350 17 G/17G
1 POWDER, FOR SOLUTION ORAL
Status: DISCONTINUED | OUTPATIENT
Start: 2023-09-24 | End: 2023-09-25 | Stop reason: HOSPADM

## 2023-09-24 RX ADMIN — PANTOPRAZOLE SODIUM 8 MG/HR: 40 INJECTION, POWDER, FOR SOLUTION INTRAVENOUS at 04:33

## 2023-09-24 RX ADMIN — PANTOPRAZOLE SODIUM 8 MG/HR: 40 INJECTION, POWDER, FOR SOLUTION INTRAVENOUS at 20:59

## 2023-09-24 RX ADMIN — SODIUM CHLORIDE, POTASSIUM CHLORIDE, SODIUM LACTATE AND CALCIUM CHLORIDE: 600; 310; 30; 20 INJECTION, SOLUTION INTRAVENOUS at 09:33

## 2023-09-24 RX ADMIN — LIDOCAINE HYDROCHLORIDE 50 MG: 20 INJECTION, SOLUTION EPIDURAL; INFILTRATION; INTRACAUDAL at 09:38

## 2023-09-24 RX ADMIN — PROPOFOL 125 MCG/KG/MIN: 10 INJECTION, EMULSION INTRAVENOUS at 09:40

## 2023-09-24 RX ADMIN — PROPOFOL 50 MG: 10 INJECTION, EMULSION INTRAVENOUS at 09:38

## 2023-09-24 RX ADMIN — ACETAMINOPHEN 650 MG: 325 TABLET, FILM COATED ORAL at 07:42

## 2023-09-24 RX ADMIN — ACETAMINOPHEN 650 MG: 325 TABLET, FILM COATED ORAL at 15:17

## 2023-09-24 RX ADMIN — ACETAMINOPHEN 650 MG: 325 TABLET, FILM COATED ORAL at 02:14

## 2023-09-24 ASSESSMENT — COGNITIVE AND FUNCTIONAL STATUS - GENERAL
SUGGESTED CMS G CODE MODIFIER DAILY ACTIVITY: CH
MOBILITY SCORE: 21
SUGGESTED CMS G CODE MODIFIER MOBILITY: CJ
SUGGESTED CMS G CODE MODIFIER DAILY ACTIVITY: CH
DAILY ACTIVITIY SCORE: 24
DAILY ACTIVITIY SCORE: 24
CLIMB 3 TO 5 STEPS WITH RAILING: A LOT
WALKING IN HOSPITAL ROOM: A LITTLE
SUGGESTED CMS G CODE MODIFIER MOBILITY: CJ
WALKING IN HOSPITAL ROOM: A LITTLE
MOBILITY SCORE: 21
STANDING UP FROM CHAIR USING ARMS: A LITTLE
CLIMB 3 TO 5 STEPS WITH RAILING: A LITTLE

## 2023-09-24 ASSESSMENT — FIBROSIS 4 INDEX
FIB4 SCORE: 1.49

## 2023-09-24 ASSESSMENT — PAIN DESCRIPTION - PAIN TYPE
TYPE: ACUTE PAIN

## 2023-09-24 ASSESSMENT — ENCOUNTER SYMPTOMS
NAUSEA: 0
ABDOMINAL PAIN: 0
PALPITATIONS: 0
VOMITING: 0
LOSS OF CONSCIOUSNESS: 1
WEAKNESS: 1
SHORTNESS OF BREATH: 0
CHILLS: 0
FEVER: 0
BLOOD IN STOOL: 0

## 2023-09-24 ASSESSMENT — PAIN SCALES - GENERAL: PAIN_LEVEL: 0

## 2023-09-24 ASSESSMENT — PATIENT HEALTH QUESTIONNAIRE - PHQ9
2. FEELING DOWN, DEPRESSED, IRRITABLE, OR HOPELESS: NOT AT ALL
SUM OF ALL RESPONSES TO PHQ9 QUESTIONS 1 AND 2: 0
1. LITTLE INTEREST OR PLEASURE IN DOING THINGS: NOT AT ALL

## 2023-09-24 NOTE — PROGRESS NOTES
Critical Care Progress Note    Date of admission  9/23/2023    Reason for Consultation  GI bleed     History of Presenting Illness  68 y.o. female with hx of DVT on Coumadin who presented 9/23/2023 with a syncopal episode last night two episodes of vomiting dark emesis this am. EMS was called to her home where she was found to have a BP 80/40. In the ED she was found to have Hb-7 and INR-7.8. Her coagulopathy was reversed with PCC and GI has consulted. Critical care consultation was requested for further evaluation and management.     She tells me she had a previous episode of upper GI bleeding in March 2023 attributed to supratherapeutic INR.  She denies any recent changes in her diet or antibiotic administration.  She did take an Advil for back pain yesterday.     Hospital Course  9/24 - Hg up to 7.4 after 2 units PRBC    Interval Problem Update  Chart review from the past 24 hours includes imaging, laboratory studies, vital signs and notes available.  Pertinent data for today's visit includes    Neuro: intact  Cardiac: tachy overnight, BP stable  Pulm: RA   Heme: INR 7.48, given PCC and vitamin K, Hg 5.4 to 7.4 s/p 2 units   /renal: NAGMA  GI:   Endo: glucose WNL with no insulin given in last 24 hours   ID:  on ctx    I/O: +827 of blood    Vital Signs for last 24 hours   Temp:  [36.6 °C (97.8 °F)-37.1 °C (98.8 °F)] 37.1 °C (98.8 °F)  Pulse:  [] 83  Resp:  [7-27] 23  BP: ()/(48-68) 100/49  SpO2:  [89 %-98 %] 89 %    Hemodynamic parameters for last 24 hours       Respiratory Information for the last 24 hours     Physical Exam  Vitals reviewed.   Constitutional:       Appearance: Normal appearance.   HENT:      Head: Normocephalic.   Eyes:      Conjunctiva/sclera: Conjunctivae normal.   Cardiovascular:      Rate and Rhythm: Normal rate and regular rhythm.      Pulses: Normal pulses.   Pulmonary:      Effort: Pulmonary effort is normal.      Breath sounds: Normal breath sounds.   Abdominal:       Palpations: Abdomen is soft.   Skin:     General: Skin is warm and dry.   Neurological:      General: No focal deficit present.      Mental Status: She is alert and oriented to person, place, and time.   Psychiatric:         Mood and Affect: Mood normal.         Behavior: Behavior normal.         Medications  Current Facility-Administered Medications   Medication Dose Route Frequency Provider Last Rate Last Admin    senna-docusate (Pericolace Or Senokot S) 8.6-50 MG per tablet 2 Tablet  2 Tablet Oral BID Waleska Stuart D.O.        And    polyethylene glycol/lytes (Miralax) PACKET 1 Packet  1 Packet Oral QDAY PRN Waleska Stuart D.O.        And    magnesium hydroxide (Milk Of Magnesia) suspension 30 mL  30 mL Oral QDAY PRN Waleska Stuart D.O.        And    bisacodyl (Dulcolax) suppository 10 mg  10 mg Rectal QDAY PRN ULYSSES GaribayO.        acetaminophen (Tylenol) tablet 650 mg  650 mg Oral Q6HRS PRN KATY Garibay.O.   650 mg at 09/24/23 0742    ondansetron (Zofran) syringe/vial injection 4 mg  4 mg Intravenous Q4HRS PRN KATY Garibay.O.        ondansetron (Zofran ODT) dispertab 4 mg  4 mg Oral Q4HRS PRN KATY Garibay.O.        pantoprazole (Protonix) 80 mg in  mL infusion  8 mg/hr Intravenous Continuous Phillip Garza M.D. 25 mL/hr at 09/24/23 0433 8 mg/hr at 09/24/23 0433       Fluids    Intake/Output Summary (Last 24 hours) at 9/24/2023 0754  Last data filed at 9/24/2023 0615  Gross per 24 hour   Intake 827.03 ml   Output --   Net 827.03 ml       Laboratory          Recent Labs     09/23/23  1427   SODIUM 142   POTASSIUM 4.3   CHLORIDE 112   CO2 18*   BUN 53*   CREATININE 0.76   CALCIUM 7.9*     Recent Labs     09/23/23  1427   ALTSGPT 14   ASTSGOT 19   ALKPHOSPHAT 54   TBILIRUBIN 0.2   LIPASE 15   GLUCOSE 152*     Recent Labs     09/23/23  1427   WBC 8.5   NEUTSPOLYS 88.50*   LYMPHOCYTES 6.00*   MONOCYTES 4.80   EOSINOPHILS 0.10   BASOPHILS 0.20    ASTSGOT 19   ALTSGPT 14   ALKPHOSPHAT 54   TBILIRUBIN 0.2     Recent Labs     09/23/23  1427 09/23/23  2145 09/24/23  0350 09/24/23  0641   RBC 2.03*  --   --   --    HEMOGLOBIN 7.3* 5.4* 7.4*  --    HEMATOCRIT 21.2*  --   --   --    PLATELETCT 232  --   --   --    PROTHROMBTM 64.9*  --   --  14.9*   APTT 51.7*  --   --   --    INR 7.48*  --   --  1.16*   IRON 51  --   --   --    FERRITIN 66.7  --   --   --    TOTIRONBC 238*  --   --   --        Imaging  X-Ray:  I have personally reviewed the images and compared with prior images.    Assessment/Plan  * GI (gastrointestinal bleed)- (present on admission)  Assessment & Plan  Follow-up hemoglobin every 4 hours  Continue Protonix infusion  Ceftriaxone DC'd as patient does not have cirrhosis  Restrictive transfusion strategies  EGD plan for today     Deep vein thrombosis (DVT) of right lower extremity (HCC)  Assessment & Plan  Holding anticoagulation for GI bleed  F/u LE DVT US  Unclear why this patient has recurrent DVT is, her last DVT was more than 20 years ago, and her first 1 was associated with pregnancy  Would recommend a DOAC for the patient, however, she states that they cost about $400 a month with her insurance  May need to consider hematology consult to decide if ongoing anticoagulation is warranted       Lines: none  Tubes: none  Diet: NPO   DVT ppx: SCDs, chemical precluded by acute blood loss anemia  GI ppx: PPI gtt  Bowel regiment: yes    I have performed a physical exam and reviewed and updated ROS and Plan today (9/24/2023). In review of yesterday's note (9/23/2023), there are no changes except as documented above.     Discussed patient condition and risk of morbidity and/or mortality with RN, RT, Pharmacy, [facility] resident, and Patient    The patient remains critically ill.  Critical care time = 62 minutes in directly providing and coordinating critical care and extensive data review.  No time overlap and excludes procedures.    Waleska Stuart,  DO   Pulmonary and Critical Care

## 2023-09-24 NOTE — PROGRESS NOTES
4 Eyes Skin Assessment Completed by MARTY Grissom and MARTY Benito.    Head WDL  Ears WDL  Nose WDL  Mouth WDL  Neck WDL  Breast/Chest WDL  Shoulder Blades WDL  Spine WDL  (R) Arm/Elbow/Hand Bruising  (L) Arm/Elbow/Hand Bruising  Abdomen Bruising  Groin WDL  Scrotum/Coccyx/Buttocks WDL  (R) Leg Bruising  (L) Leg Bruising  (R) Heel/Foot/Toe WDL  (L) Heel/Foot/Toe WDL          Devices In Places ECG, Blood Pressure Cuff, Pulse Ox, and SCD's      Interventions In Place Waffle Overlay, Pillows, Low Air Loss Mattress, and Heels Loaded W/Pillows    Possible Skin Injury No    Pictures Uploaded Into Epic N/A  Wound Consult Placed N/A  RN Wound Prevention Protocol Ordered No

## 2023-09-24 NOTE — ANESTHESIA PREPROCEDURE EVALUATION
Case: [id number] Date/Time: 09/24/23 0909    Procedure: GASTROSCOPY    Location: [facility] OR 06 / SURGERY [facility]    Surgeons: Gissell Rodríguez M.D.      EGD bleed with symptomatic anemia yesterday. Reversed with PCC and PRBCs given overnight. EDG this AM for evaluation.     Relevant Problems   CARDIAC   (positive) DVT (deep vein thrombosis) in pregnancy      Other   (positive) GI (gastrointestinal bleed)   (positive) GI bleeding       Physical Exam    Airway   Mallampati: II  TM distance: >3 FB  Neck ROM: full       Cardiovascular - normal exam  Rhythm: regular  Rate: normal  (-) murmur     Dental - normal exam           Pulmonary - normal exam  Breath sounds clear to auscultation     Abdominal    Neurological - normal exam                 Anesthesia Plan    ASA 2- EMERGENT   ASA physical status emergent criteria: acute hemorrhage    Plan - MAC               Induction: intravenous    Postoperative Plan: Postoperative administration of opioids is intended.    Pertinent diagnostic labs and testing reviewed    Informed Consent:    Anesthetic plan and risks discussed with patient.    Use of blood products discussed with: patient whom consented to blood products.

## 2023-09-24 NOTE — ED NOTES
Pt transported to T621 with RN. Pt on monitor with Protonix running. G[facility] 15 pt in possession of all belongings

## 2023-09-24 NOTE — ASSESSMENT & PLAN NOTE
Holding anticoagulation for GI bleed  F/u LE DVT US  Unclear why this patient has recurrent DVT is, her last DVT was more than 20 years ago, and her first 1 was associated with pregnancy  Would recommend a DOAC for the patient, however, she states that they cost about $400 a month with her insurance  May need to consider hematology consult to decide if ongoing anticoagulation is warranted

## 2023-09-24 NOTE — CONSULTS
Hospital Medicine Consultation    Date of Service  9/24/2023    Referring Physician  Waleska Stuart D.O.    Consulting Physician  Leno Carballo M.D.    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    History of Presenting Illness  68 y.o. female who presented 9/23/2023 with weakness and low blood pressure.  Mrs. Raza has a past medical history of multiple prior DVTs.  Her first was 46 years ago while pregnant.  She has been on and off Coumadin since that clot.  She has had multiple subsequent episodes when Coumadin has been stopped the last was 2010 after surgery.  She was admitted here in March because of upper GI bleed was found to have anastomotic ulcers.  She presented the emergency room on 9/23/2023 with weakness and low blood pressure.  She was found to have a hemoglobin of 5.4 and an INR of 7.48.  She was given reversal and 2 units packed red blood cells admitted to the ICU.  On 9/24/2023 she underwent EGD revealing again ulcers at the anastomosis of the gastric bypass site.  Upon questioning she is unable to afford direct oral anticoagulants and is also unable to afford to go to the Coumadin clinic for subsequent INR checks.    Review of Systems  Review of Systems   Constitutional:  Negative for chills and fever.   Gastrointestinal:  Negative for blood in stool and melena.   All other systems reviewed and are negative.      Past Medical History  Prior DVTs first was 46 years ago during pregnancy last was 2010 when off coumadin  Upper GI bleed s/p EGD 3/2023 revealing anastomotic ulcers    Surgical History  Multiple orthopedic surgeries and gastric bypass    Family History  No family hx of blood clots    Social History   reports that she has never smoked. She has never used smokeless tobacco. She reports current alcohol use. She reports that she does not use drugs.    Medications  Prior to Admission Medications   Prescriptions Last Dose Informant Patient  Reported? Taking?   cycloSPORINE (RESTASIS) 0.05 % ophthalmic emulsion FEW DAYS AGO at PRN Patient Yes Yes   Sig: Administer 1 Drop into the right eye 3 times a day as needed. Indications: Drying and Inflammation of Cornea and Conjunctiva of Eyes   gabapentin (NEURONTIN) 600 MG tablet 9/22/2023 at PM Patient Yes Yes   Sig: Take 600 mg by mouth at bedtime.   meloxicam (MOBIC) 15 MG tablet 9/22/2023 at AM Patient Yes Yes   Sig: Take 15 mg by mouth every day.   oxyCODONE-acetaminophen (PERCOCET) 5-325 MG Tab 9/22/2023 at AM Patient Yes Yes   Sig: Take 0.5 Tablets by mouth 2 times a day.   warfarin (COUMADIN) 3 MG Tab 9/22/2023 at AM Patient Yes Yes   Sig: Take 3-4.5 mg by mouth every day. 4.5 mg once a week   3 mg all other days      Facility-Administered Medications: None       Allergies  No Known Allergies    Physical Exam  Temp:  [36.4 °C (97.6 °F)-37.5 °C (99.5 °F)] 36.4 °C (97.6 °F)  Pulse:  [] 72  Resp:  [7-27] 15  BP: ()/(48-68) 109/57  SpO2:  [89 %-100 %] 95 %    Physical Exam  Vitals and nursing note reviewed.   Constitutional:       General: She is not in acute distress.     Appearance: She is not toxic-appearing.   HENT:      Mouth/Throat:      Mouth: Mucous membranes are dry.   Cardiovascular:      Rate and Rhythm: Normal rate and regular rhythm.   Pulmonary:      Effort: Pulmonary effort is normal.      Breath sounds: Normal breath sounds.   Abdominal:      General: There is no distension.      Tenderness: There is no abdominal tenderness.   Musculoskeletal:      Right lower leg: No edema.      Left lower leg: No edema.   Skin:     General: Skin is dry.      Coloration: Skin is pale.   Neurological:      General: No focal deficit present.      Mental Status: She is alert and oriented to person, place, and time.   Psychiatric:         Mood and Affect: Mood normal.         Behavior: Behavior normal.         Thought Content: Thought content normal.         Judgment: Judgment normal.          Fluids  Date 09/24/23 0700 - 09/25/23 0659   Shift 1533-8591 [id number] [id number] 24 Hour Total   INTAKE   I.V. 300   300   IV Piggyback 97.9   97.9   Shift Total 397.9   397.9   OUTPUT   Urine 300   300   Shift Total 300   300   Weight (kg) 66.1 66.1 66.1 66.1       Laboratory  Recent Labs     09/23/23  1427 09/23/23  2145 09/24/23  0350 09/24/23  1048   WBC 8.5  --   --   --    RBC 2.03*  --   --   --    HEMOGLOBIN 7.3* 5.4* 7.4* 8.4*   HEMATOCRIT 21.2*  --   --  25.2*   .4*  --   --   --    MCH 36.0*  --   --   --    MCHC 34.4  --   --   --    RDW 55.2*  --   --   --    PLATELETCT 232  --   --   --    MPV 10.3  --   --   --      Recent Labs     09/23/23  1427   SODIUM 142   POTASSIUM 4.3   CHLORIDE 112   CO2 18*   GLUCOSE 152*   BUN 53*   CREATININE 0.76   CALCIUM 7.9*     Recent Labs     09/23/23  1427 09/24/23  0641   APTT 51.7*  --    INR 7.48* 1.16*                 Imaging  DX-CHEST-PORTABLE (1 VIEW)   Final Result      No acute cardiopulmonary abnormality.         US-EXTREMITY VENOUS LOWER BILAT    (Results Pending)       Assessment/Plan  * GI (gastrointestinal bleed)- (present on admission)  Assessment & Plan  Upper GI bleed in the setting of supra therapeutic INR at 7.5 with reversal   EGD on 9/24 revealed anastomotic ulcers  Requiring blood transfusions  IV protonix  Refrain from NSAIDs      Supratherapeutic INR- (present on admission)  Assessment & Plan  INR was 7.48 on admission  This was reversed    Anemia due to acute blood loss- (present on admission)  Assessment & Plan  Status post transfusion of 2 units RBCs   Hb is now 8.4  Follow serial hemoglobins and transfuse for Hb less than 7.     History of gastric bypass- (present on admission)  Assessment & Plan  Approximately 30 years ago.  With recurrent bleeding at the anastomosis site    Deep vein thrombosis (DVT) of right lower extremity (HCC)- (present on admission)  Assessment & Plan  Long history of previous DVTs x 5 last was 2010  when she was off coumadin  Duplex study pending bilat.   Consider stopping coumadin at this time and outpatient follow up with Dr. Bloch or Dr. Wu in the [facility].

## 2023-09-24 NOTE — ASSESSMENT & PLAN NOTE
Status post transfusion of 2 units RBCs   Hb is now 8.4  Follow serial hemoglobins and transfuse for Hb less than 7.

## 2023-09-24 NOTE — ANESTHESIA TIME REPORT
Anesthesia Start and Stop Event Times     Date Time Event    9/24/2023 0919 Ready for Procedure     0933 Anesthesia Start     0954 Anesthesia Stop        Responsible Staff  09/24/23    Name Role Begin End    Rhett Tan M.D. Anesth 0933 0961        Overtime Reason:  no overtime (within assigned shift)    Comments:

## 2023-09-24 NOTE — ASSESSMENT & PLAN NOTE
Follow-up hemoglobin every 4 hours  Continue Protonix infusion  Ceftriaxone DC'd as patient does not have cirrhosis  Restrictive transfusion strategies  EGD plan for today

## 2023-09-24 NOTE — OR NURSING
Pt arrived via bed from OR w/ RN and anesthesia. VSS. Report received. Orders reviewed and initiated.

## 2023-09-24 NOTE — PROGRESS NOTES
[facility] ICU Progress Note      Admit Date: 9/23/2023  ICU Day:  1      Resident(s): Guy Spears D.O.  Attending: Dr. Stuart    Date & Time:   9/24/2023   9:22 AM       Patient ID:    Name:             Ana Maria Raza   YOB: 1955  Age:                 68 y.o.  female   MRN:               [id number]    HPI:  Ana Maria Raza is a 67 y/o woman w/ PMHx of DVT (recurrent, ongoing for 40 years), on coumadin, right leg AV fistula, gastric bypass with GI bleeding at anastomosis in 03/2023 who presents on 9/23/2023 for multiple episodes of melena at home with concerns for upper GI bleed.     Hospital Course:  9/23:  Admit to ICU, 2 units pRBC overnight.    Interval Events:  Hemoglobin dropped following admission, given 2u pRBCs.  HR has ranged from normal sinus rhythm  and tachycardic.  BP soft, but responded well following administration of blood products.  On protonix gtt  Received vitamin K and Kcentra in the ED.  No further melena since arrival to ICU.     Consultants:  Gastroenterology     Procedures:  9/24:  EGD showing clean based ulcers at anastomosis site.       Review of Systems   Constitutional:  Negative for chills and fever.   HENT:  Negative for congestion and nosebleeds.    Respiratory:  Negative for shortness of breath.    Cardiovascular:  Negative for chest pain and palpitations.   Gastrointestinal:  Positive for melena. Negative for abdominal pain, nausea and vomiting.   Neurological:  Positive for loss of consciousness (prior to arrival) and weakness (generalized).       PHYSICAL EXAM    Physical Exam  Vitals and nursing note reviewed.   Constitutional:       General: She is not in acute distress.     Appearance: She is not ill-appearing or toxic-appearing.   HENT:      Head: Normocephalic and atraumatic.      Mouth/Throat:      Mouth: Mucous membranes are moist.      Pharynx: Oropharynx is clear.   Eyes:      General: No scleral icterus.     Conjunctiva/sclera:  Conjunctivae normal.   Cardiovascular:      Rate and Rhythm: Normal rate and regular rhythm.      Pulses: Normal pulses.      Heart sounds: No murmur heard.     No friction rub. No gallop.   Pulmonary:      Effort: Pulmonary effort is normal. No respiratory distress.      Breath sounds: Normal breath sounds. No wheezing, rhonchi or rales.   Abdominal:      General: Abdomen is flat. There is no distension.      Palpations: Abdomen is soft.      Tenderness: There is no abdominal tenderness.   Musculoskeletal:      Right lower leg: Edema (1+ pitting edema) present.      Left lower leg: No edema.   Skin:     General: Skin is warm and dry.   Neurological:      Mental Status: She is alert.      Comments: Moving all 4 extremities spontaneously          Respiratory:     Respiration: 14, Pulse Oximetry: 100 %    Chest Tube Drains:          HemoDynamics:  Pulse: 79 Blood Pressure : 105/51      Neuro:      Fluids:       Intake/Output Summary (Last 24 hours) at 2023 0922  Last data filed at 2023 0615  Gross per 24 hour   Intake 827.03 ml   Output --   Net 827.03 ml       Weight: 66.1 kg (145 lb 11.6 oz)  Body mass index is 24.25 kg/m².    Recent Labs     23  1427   SODIUM 142   POTASSIUM 4.3   CHLORIDE 112   CO2 18*   BUN 53*   CREATININE 0.76   CALCIUM 7.9*       GI/Nutrition:  Recent Labs     23  1427   ALTSGPT 14   ASTSGOT 19   ALKPHOSPHAT 54   TBILIRUBIN 0.2   LIPASE 15   GLUCOSE 152*       Heme:  Recent Labs     23  1427 23  2145 23  0350 23  0641   RBC 2.03*  --   --   --    HEMOGLOBIN 7.3* 5.4* 7.4*  --    HEMATOCRIT 21.2*  --   --   --    PLATELETCT 232  --   --   --    PROTHROMBTM 64.9*  --   --  14.9*   APTT 51.7*  --   --   --    INR 7.48*  --   --  1.16*   IRON 51  --   --   --    FERRITIN 66.7  --   --   --    TOTIRONBC 238*  --   --   --        Infectious Disease:  Temp  Av.9 °C (98.4 °F)  Min: 36.6 °C (97.8 °F)  Max: 37.1 °C (98.8 °F)  Recent Labs      09/23/23  1427   WBC 8.5   NEUTSPOLYS 88.50*   LYMPHOCYTES 6.00*   MONOCYTES 4.80   EOSINOPHILS 0.10   BASOPHILS 0.20   ASTSGOT 19   ALTSGPT 14   ALKPHOSPHAT 54   TBILIRUBIN 0.2       Meds:   [MAR Hold] senna-docusate  2 Tablet      And    [MAR Hold] polyethylene glycol/lytes  1 Packet      And    [MAR Hold] magnesium hydroxide  30 mL      And    [MAR Hold] bisacodyl  10 mg      [MAR Hold] acetaminophen  650 mg      [MAR Hold] ondansetron  4 mg      [MAR Hold] ondansetron  4 mg      pantoprazole (Protonix) 80 mg in  mL infusion  8 mg/hr 8 mg/hr (09/24/23 0433)          Assessment and Plan:  * GI (gastrointestinal bleed)  Assessment & Plan  Upper GI bleed, likely due to previous ulcers, which were seen again on repeat endoscopy.  Gastroenterology following, appreciate assistance  Trend H&H q8 hours now stable after EGD  PPI BID for 8 weeks  Discontinued Ceftriaxone as no previous history of cirrhosis or ascites.  Per GI ok to resume anticoagulation on 9/25  Transfuse for hemoglobin <7.    Deep vein thrombosis (DVT) of right lower extremity (HCC)  Assessment & Plan  Holding anticoagulation for GI bleed.  Uncertain history of DVT, initial DVT was when she was in her 20s and pregnant, then has had recurrent DVTs since then, although no recent imaging.    Has not followed with hematology, recommend outpatient hematology follow-up  Patient reports DOACs are very expensive with her current insurance, but so are her anticoagulation clinic visits so she has not been going to these.        Quality Measures:  Feeding:  ADAT following EGD  Analgesia: tylenol  Sedation: n/a  Thromboprophylaxis: SCDs  Head of bed: >30 degrees  Ulcer prophylaxis: PPI BID  Glycemic control: n/a  Bowel care: bowel regimen  Indwelling lines: pIV  Deescalation of antibiotics: n/a      CODE STATUS:   Full   DISPO:    Ok to transfer out of ICU

## 2023-09-24 NOTE — ANESTHESIA POSTPROCEDURE EVALUATION
Patient: Ana Maria Raza    Procedure Summary     Date: 09/24/23 Room / Location: [facility] OR 06 / SURGERY [facility]    Anesthesia Start: 0933 Anesthesia Stop: 0954    Procedures:       GASTROSCOPY (Esophagus)      GASTROSCOPY, WITH BIOPSY (Esophagus) Diagnosis: (anastomosis x2)    Surgeons: Gissell Rodríguez M.D. Responsible Provider: Rhett Tan M.D.    Anesthesia Type: MAC ASA Status: 2 - Emergent          Final Anesthesia Type: MAC  Last vitals  BP   Blood Pressure : 97/52    Temp   37.1 °C (98.8 °F)    Pulse   75   Resp   16    SpO2   100 %      Anesthesia Post Evaluation    Patient location during evaluation: PACU  Patient participation: complete - patient participated  Level of consciousness: awake and alert  Pain score: 0    Airway patency: patent  Anesthetic complications: no  Cardiovascular status: hemodynamically stable  Respiratory status: acceptable  Hydration status: euvolemic    PONV: none          No notable events documented.     Nurse Pain Score: 0 (NPRS)

## 2023-09-24 NOTE — ASSESSMENT & PLAN NOTE
Upper GI bleed in the setting of supra therapeutic INR at 7.5 with reversal   EGD on 9/24 revealed anastomotic ulcers  Requiring blood transfusions  IV protonix  Refrain from NSAIDs

## 2023-09-24 NOTE — PROGRESS NOTES
Pt received from [facility]. Tele monitor in place. VSS. Pt oriented to room. Educated on use of the call light. Pt demonstrated use of the call light. Discussed POC. All questions answered.

## 2023-09-24 NOTE — CARE PLAN
Problem: Pain - Standard  Goal: Alleviation of pain or a reduction in pain to the patient’s comfort goal  Outcome: Progressing     Problem: Hemodynamics  Goal: Patient's hemodynamics, fluid balance and neurologic status will be stable or improve  Outcome: Progressing     Problem: Risk for Fluid Volume Deficit Related to Bleeding  Goal: Patient will show no signs and symptoms of excessive bleeding  Outcome: Progressing   The patient is Stable - Low risk of patient condition declining or worsening    Shift Goals  Clinical Goals: Monitor bleeding and hemodynamics  Patient Goals: Rest  Family Goals: HERBIE    Progress made toward(s) clinical / shift goals:  Progress toward goals as stated above.    Patient is not progressing towards the following goals:

## 2023-09-24 NOTE — ASSESSMENT & PLAN NOTE
Long history of previous DVTs x 5 last was 2010 when she was off coumadin  Duplex study pending bilat.   Consider stopping coumadin at this time and outpatient follow up with Dr. Bloch or Dr. Wu in the [facility].

## 2023-09-24 NOTE — OR NURSING
Report called to receiving MARTY Velasco. Pt taken via bed to T625 w/ RN, monitor and O2. VSS. No distress. Attempted to call , not option to leave VM.

## 2023-09-25 ENCOUNTER — APPOINTMENT (OUTPATIENT)
Dept: RADIOLOGY | Facility: MEDICAL CENTER | Age: 68
DRG: 378 | End: 2023-09-25
Attending: INTERNAL MEDICINE
Payer: COMMERCIAL

## 2023-09-25 ENCOUNTER — PHARMACY VISIT (OUTPATIENT)
Dept: PHARMACY | Facility: MEDICAL CENTER | Age: 68
End: 2023-09-25
Payer: COMMERCIAL

## 2023-09-25 VITALS
RESPIRATION RATE: 16 BRPM | HEART RATE: 74 BPM | DIASTOLIC BLOOD PRESSURE: 50 MMHG | BODY MASS INDEX: 24.72 KG/M2 | TEMPERATURE: 97.7 F | OXYGEN SATURATION: 99 % | WEIGHT: 148.37 LBS | HEIGHT: 65 IN | SYSTOLIC BLOOD PRESSURE: 101 MMHG

## 2023-09-25 LAB
HGB BLD-MCNC: 7.4 G/DL (ref 12–16)
PATHOLOGY CONSULT NOTE: NORMAL

## 2023-09-25 PROCEDURE — 700105 HCHG RX REV CODE 258: Performed by: INTERNAL MEDICINE

## 2023-09-25 PROCEDURE — C9113 INJ PANTOPRAZOLE SODIUM, VIA: HCPCS | Performed by: INTERNAL MEDICINE

## 2023-09-25 PROCEDURE — A9270 NON-COVERED ITEM OR SERVICE: HCPCS | Performed by: HOSPITALIST

## 2023-09-25 PROCEDURE — 700111 HCHG RX REV CODE 636 W/ 250 OVERRIDE (IP): Performed by: INTERNAL MEDICINE

## 2023-09-25 PROCEDURE — 99239 HOSP IP/OBS DSCHRG MGMT >30: CPT | Performed by: HOSPITALIST

## 2023-09-25 PROCEDURE — 93970 EXTREMITY STUDY: CPT | Mod: 26 | Performed by: INTERNAL MEDICINE

## 2023-09-25 PROCEDURE — 36415 COLL VENOUS BLD VENIPUNCTURE: CPT

## 2023-09-25 PROCEDURE — 93970 EXTREMITY STUDY: CPT

## 2023-09-25 PROCEDURE — 700102 HCHG RX REV CODE 250 W/ 637 OVERRIDE(OP): Performed by: HOSPITALIST

## 2023-09-25 PROCEDURE — RXMED WILLOW AMBULATORY MEDICATION CHARGE: Performed by: HOSPITALIST

## 2023-09-25 PROCEDURE — 85018 HEMOGLOBIN: CPT

## 2023-09-25 RX ORDER — OMEPRAZOLE 20 MG/1
20 CAPSULE, DELAYED RELEASE ORAL 2 TIMES DAILY
Status: DISCONTINUED | OUTPATIENT
Start: 2023-09-25 | End: 2023-09-25 | Stop reason: HOSPADM

## 2023-09-25 RX ORDER — FAMOTIDINE 20 MG/1
20 TABLET, FILM COATED ORAL 2 TIMES DAILY
Qty: 60 TABLET | Refills: 0 | Status: SHIPPED | OUTPATIENT
Start: 2023-09-25

## 2023-09-25 RX ADMIN — PANTOPRAZOLE SODIUM 8 MG/HR: 40 INJECTION, POWDER, FOR SOLUTION INTRAVENOUS at 06:19

## 2023-09-25 RX ADMIN — OMEPRAZOLE 20 MG: 20 CAPSULE, DELAYED RELEASE ORAL at 09:32

## 2023-09-25 ASSESSMENT — FIBROSIS 4 INDEX: FIB4 SCORE: 1.49

## 2023-09-25 NOTE — PROGRESS NOTES
Bedside report received. Bed locked and in low position, call light within reach. Hourly rounding in place. No further needs at this time.

## 2023-09-25 NOTE — CARE PLAN
The patient is Stable - Low risk of patient condition declining or worsening    Shift Goals  Clinical Goals: Monitor VS, bleeding, labs  Patient Goals: sleep  Family Goals: safety, get better    Progress made toward(s) clinical / shift goals:    Problem: Hemodynamics  Goal: Patient's hemodynamics, fluid balance and neurologic status will be stable or improve  Outcome: Progressing     Problem: Risk for Bleeding  Goal: Patient will take measures to prevent bleeding and recognizes signs of bleeding that need to be reported immediately to a health care professional  Outcome: Progressing     Problem: Risk for Bleeding  Goal: Patient will not experience bleeding as evidenced by normal blood pressure, stable hematocrit and hemoglobin levels and desired ranges for coagulation profiles  Outcome: Progressing     Problem: Fall Risk  Goal: Patient will remain free from falls  Outcome: Progressing       Patient is not progressing towards the following goals:

## 2023-09-25 NOTE — PROGRESS NOTES
Report received from outgoing nurse, care transferred at 1900. Patient currently in  on the monitor and A&Ox 4. Patient reports no GI discomfort or nausea at this time. No headache, dizziness, or numbness/tingling in the extremities. Whiteboard updated with plan for the day and names of staff. No other questions or concerns at this time from the patient. Call light within reach, fall precautions in place.

## 2023-09-25 NOTE — DISCHARGE SUMMARY
Discharge Summary    CHIEF COMPLAINT ON ADMISSION  Chief Complaint   Patient presents with    Weakness     BIB [facility] from home. Patient had possible syncopal episode while on toilet. BP on EMS arrival was 80/60 with HR of 120. They placed IV and gave 500cc NS and 4mg zofran. Patient then had /64, HR of 88. Patient reports feeling dizzy and like she was going to pass out. Denies chest pain. She has had 3 falls in past 24 hours. PMH DVT, is on coumadin but has not had INR checked since April. Patient's  reports dark vomit       Reason for Admission  ems     Admission Date  9/23/2023    CODE STATUS  Full Code    HPI & HOSPITAL COURSE  This is a 68 y.o. female here with weakness and low blood pressure   Mrs. Raza has a past medical history of multiple prior DVTs.  Her first was 46 years ago while pregnant.  She has been on and off Coumadin since that clot.  She has had multiple subsequent episodes when Coumadin has been stopped the last was 2010 after surgery.  She was admitted here in March because of upper GI bleed was found to have anastomotic ulcers.  She presented the emergency room on 9/23/2023 with weakness and low blood pressure.  She was found to have a hemoglobin of 5.4 and an INR of 7.48.  She was given reversal and 2 units packed red blood cells admitted to the ICU.  On 9/24/2023 she underwent EGD revealing again ulcers at the anastomosis of the gastric bypass site.  Upon questioning she is unable to afford direct oral anticoagulants and is also unable to afford to go to the Coumadin clinic for subsequent INR checks.    9/25: Mrs. Raza  Much better today.  Her hemoglobin is 1.4.  I spoke with GI and they will follow-up on the results of the biopsy.  She is able to ambulate without assistance and is tolerating a regular diet.  She did not have any bowel movements overnight.  She will be discharged home in stable condition with her .  We had a long discussion that I do not  believe she is a candidate for Coumadin in the near future.  She has been on Coumadin for 46 years because of a blood clot while she was pregnant and her last blood clot was in 2010.  This is her second very significant GI bleed requiring transfusions this year.  We did a DVT study that was negative for clots.  I recommended that she follow-up with the [facility] and vascular [facility] with Dr. Bloch or Dr. Wu discuss if and when she is a candidate for anticoagulation in the future.  Of note is because of insurance limitations she cannot afford Eliquis or Xarelto.    Therefore, she is discharged in good and stable condition to home with close outpatient follow-up.    The patient met 2-midnight criteria for an inpatient stay at the time of discharge.    Discharge Date  9/25    FOLLOW UP ITEMS POST DISCHARGE  I have placed a referral to [facility]  PCP for hemoglobin next week  DISCHARGE DIAGNOSES  Principal Problem:    GI (gastrointestinal bleed) (POA: Yes)  Active Problems:    Deep vein thrombosis (DVT) of right lower extremity (HCC) (POA: Yes)    History of gastric bypass (POA: Yes)    Anemia due to acute blood loss (POA: Yes)    Supratherapeutic INR (POA: Yes)  Resolved Problems:    * No resolved hospital problems. *      FOLLOW UP  No future appointments.  [facility]  [id number]  [place] [address]  [phone]  Follow up        MEDICATIONS ON DISCHARGE     Medication List        START taking these medications        Instructions   famotidine 20 MG Tabs  Commonly known as: Pepcid   Take 1 Tablet by mouth 2 times a day.  Dose: 20 mg            CONTINUE taking these medications        Instructions   gabapentin 600 MG tablet  Commonly known as: Neurontin   Take 600 mg by mouth at bedtime.  Dose: 600 mg     oxyCODONE-acetaminophen 5-325 MG Tabs  Commonly known as: Percocet   Take 0.5 Tablets by mouth 2 times a day.  Dose: 0.5 Tablet     Restasis 0.05 %  ophthalmic emulsion  Generic drug: cycloSPORINE   Administer 1 Drop into the right eye 3 times a day as needed. Indications: Drying and Inflammation of Cornea and Conjunctiva of Eyes  Dose: 1 Drop            STOP taking these medications      meloxicam 15 MG tablet  Commonly known as: Mobic     warfarin 3 MG Tabs  Commonly known as: Coumadin              Allergies  No Known Allergies    DIET  Orders Placed This Encounter   Procedures    Diet Order Diet: Regular     Standing Status:   Standing     Number of Occurrences:   1     Order Specific Question:   Diet:     Answer:   Regular [1]       ACTIVITY  As tolerated.      CONSULTATIONS  GI    PROCEDURES  EGD on 9/24:    POSTOPERATIVE DIAGNOSIS:   S/p R-Y bypass. Pouch around 3-4cm in length.   Normal blind side bowel.   We went down the jejunal side for 25cm, no old blood, no evidence of recent bleeding.   Two clean-based ulcers, 0.5-1cm in size the anastomosis, no high risk stigmata. Likely the source of bleeding.   Stomach was biopsied for HP study.   LABORATORY  Lab Results   Component Value Date    SODIUM 142 09/23/2023    POTASSIUM 4.3 09/23/2023    CHLORIDE 112 09/23/2023    CO2 18 (L) 09/23/2023    GLUCOSE 152 (H) 09/23/2023    BUN 53 (H) 09/23/2023    CREATININE 0.76 09/23/2023        Lab Results   Component Value Date    WBC 8.5 09/23/2023    HEMOGLOBIN 7.4 (L) 09/25/2023    HEMATOCRIT 25.2 (L) 09/24/2023    PLATELETCT 232 09/23/2023      US-EXTREMITY VENOUS LOWER BILAT   Final Result      DX-CHEST-PORTABLE (1 VIEW)   Final Result      No acute cardiopulmonary abnormality.               Total time of the discharge process exceeds 32 minutes.

## 2023-09-25 NOTE — DISCHARGE INSTRUCTIONS
Please do not take warfarin until cleared by your primary doctor or Drs. Bloch or Bryce in the [facility]   Please do not take Mobic, aspirin, Alleve, ibuprofen

## 2023-09-25 NOTE — PROGRESS NOTES
Patient being discharged. Patient educated on discharge instructions and new prescriptions. Patient verbalized understanding. Follow up appt made with PCP PIV removed, telemetry monitor checked in. Patient going home with .     Meds to beds and all belongings sent with patient.

## 2023-09-29 ENCOUNTER — TELEPHONE (OUTPATIENT)
Dept: VASCULAR LAB | Facility: MEDICAL CENTER | Age: 68
End: 2023-09-29
Payer: COMMERCIAL

## 2023-09-29 NOTE — TELEPHONE ENCOUNTER
Pt called asking to f/u w/ [facility] after recent hospitalization d/t GIB.    Pt discharged from the hospital off of warfarin. She was instructed to f/u w/ one of our Vascular Medicine Physicians regarding resumption of therapy.     Called pt regarding the above - recommended she contact her PCP to discuss resuming warfarin. Pt declines to call her PCP - she states she has an appt w/ them next Thurs, 10/5/23.    Advised pt that I could forward her case to our Vascular Providers to see if someone would be able to touch base w/ her prior to next Thursday to discuss disposition of OAC.    Gaston Kam, PharmD, BCACP     CC:   Dr. Bloch & Dr. Wu

## 2023-10-02 NOTE — TELEPHONE ENCOUNTER
Nathen Wu M.D.  JANELLE Bryant.  Cc: Gaston Kam PharmD; Michael J Bloch, M.D.; IRVING Bishop  Caller: Unspecified (3 days ago,  2:12 PM)  Gaston   In light of second episode of major GIB bleeding this year with most recent event down to hgb 5.4 requiring transfusions constitutes 2 discrete episodes of major bleeding and would preclude the use of OAC at this time as per standard guidelines as risks of OAC outweigh benefits.     Most recent DVT was 2010, most recent duplex was negative for DVTs.     She could consider IVC filter placement.     She has not established with vascular medicine clinic, so I cannot make other formal recommendations at this time. She is welcome to establish care with our clinic through referral from PCP and we can get her in next available for further review if desired.     Thanks     ______________________________________________________________________________    Called and notified pt of the above. She does have a referral in place to Vascular Medicine - will have MA's reach out to call and schedule initial visit ASAP. Pt also seeing her PCP this week.    Gaston Kam, Kelsey, BCACP     CC: Vascular Medicine MA Pool

## 2023-10-06 NOTE — TELEPHONE ENCOUNTER
Attempted to call pt regarding disposition of OAC after her PCP appt - no answer. VM not set up.    Will try back at a later time.    Gaston Kam, FawnD, BCACP

## 2023-10-10 NOTE — TELEPHONE ENCOUNTER
Called pt - she met w/ her PCP, Dr. Angel Conley at [facility], he advised pt to HOLD OAC x 4 weeks. They have f/u scheduled in 4 weeks to re-assess.    Gaston Kam, FawnD, BCACP

## 2023-11-20 ENCOUNTER — APPOINTMENT (OUTPATIENT)
Dept: VASCULAR LAB | Facility: MEDICAL CENTER | Age: 68
End: 2023-11-20
Attending: FAMILY MEDICINE
Payer: COMMERCIAL

## 2023-12-06 ENCOUNTER — APPOINTMENT (OUTPATIENT)
Dept: VASCULAR LAB | Facility: MEDICAL CENTER | Age: 68
End: 2023-12-06
Payer: COMMERCIAL

## 2024-06-27 ENCOUNTER — TELEPHONE (OUTPATIENT)
Dept: VASCULAR LAB | Facility: MEDICAL CENTER | Age: 69
End: 2024-06-27
Payer: COMMERCIAL

## 2024-06-27 NOTE — TELEPHONE ENCOUNTER
[facility]    Left message for pt to call our clinic to follow up with status of her anticoagulation.      Nathen Perez, PharmD

## 2024-08-20 NOTE — TELEPHONE ENCOUNTER
Pt asked for c/b regarding meloxicam use - she is asking for recommendations for arthritis relief. Advised pt to try scheduled APAP and to otherwise f/u w/ PCP regarding arthritis management.    Gaston Kam, FawnD, BCACP   ,DirectAddress_Unknown

## (undated) DEVICE — ELECTRODE 850 FOAM ADHESIVE - HYDROGEL RADIOTRNSPRNT (50/PK)

## (undated) DEVICE — MASK OXYGEN VNYL ADLT MED CONC WITH 7 FOOT TUBING  - (50EA/CA)

## (undated) DEVICE — RESCUE RETRIEVAL NET (5EA/BX)

## (undated) DEVICE — SENSOR SPO2 ADULT LNCS ADTX (20/BX) ORDER ITEM #19593

## (undated) DEVICE — FORCEP GRASPING RESCUE COMBO RAT/ALLIGATOR (5EA/BX)

## (undated) DEVICE — BLOCK BITE MAXI DENTAL RETENTION RIM (100EA/BX)

## (undated) DEVICE — SET EXTENSION WITH 2 PORTS (48EA/CA) ***PART #2C8610 IS A SUBSTITUTE*****

## (undated) DEVICE — KIT CUSTOM PROCEDURE SINGLE FOR ENDO  (15/CA)

## (undated) DEVICE — MASK PANORAMIC OXYGEN PRO2 (30EA/CA)

## (undated) DEVICE — PORT AUXILLARY WATER (50EA/BX)

## (undated) DEVICE — BITE BLOCK, DISP.

## (undated) DEVICE — NEPTUNE 4 PORT MANIFOLD - (20/PK)

## (undated) DEVICE — CANISTER SUCTION RIGID RED 1500CC (40EA/CA)

## (undated) DEVICE — WATER IRRIGATION STERILE 1000ML (12EA/CA)

## (undated) DEVICE — FORCEP RADIAL JAW 4 STANDARD CAPACITY W/NEEDLE 240CM (40EA/BX)

## (undated) DEVICE — BUTTON ENDOSCOPY DISPOSABLE

## (undated) DEVICE — TOWEL STOP TIMEOUT SAFETY FLAG (40EA/CA)

## (undated) DEVICE — TUBE CONNECTING SUCTION - CLEAR PLASTIC STERILE 72 IN (50EA/CA)

## (undated) DEVICE — CONTAINER, SPECIMEN, STERILE

## (undated) DEVICE — SENSOR OXIMETER ADULT SPO2 RD SET (20EA/BX)

## (undated) DEVICE — SOD. CHL. INJ. 0.9% 1000 ML - (14EA/CA 60CA/PF)

## (undated) DEVICE — SET LEADWIRE 5 LEAD BEDSIDE DISPOSABLE ECG (1SET OF 5/EA)